# Patient Record
Sex: FEMALE | Race: WHITE | HISPANIC OR LATINO | URBAN - METROPOLITAN AREA
[De-identification: names, ages, dates, MRNs, and addresses within clinical notes are randomized per-mention and may not be internally consistent; named-entity substitution may affect disease eponyms.]

---

## 2017-07-08 ENCOUNTER — EMERGENCY (EMERGENCY)
Facility: HOSPITAL | Age: 47
LOS: 1 days | Discharge: HOME | End: 2017-07-08
Attending: EMERGENCY MEDICINE | Admitting: EMERGENCY MEDICINE
Payer: MEDICARE

## 2017-07-08 VITALS
WEIGHT: 198.42 LBS | DIASTOLIC BLOOD PRESSURE: 80 MMHG | HEART RATE: 80 BPM | RESPIRATION RATE: 18 BRPM | HEIGHT: 66 IN | TEMPERATURE: 99 F | OXYGEN SATURATION: 100 % | SYSTOLIC BLOOD PRESSURE: 145 MMHG

## 2017-07-08 VITALS
TEMPERATURE: 98 F | OXYGEN SATURATION: 97 % | DIASTOLIC BLOOD PRESSURE: 71 MMHG | SYSTOLIC BLOOD PRESSURE: 114 MMHG | RESPIRATION RATE: 18 BRPM | HEART RATE: 72 BPM

## 2017-07-08 DIAGNOSIS — R06.02 SHORTNESS OF BREATH: ICD-10-CM

## 2017-07-08 DIAGNOSIS — E87.6 HYPOKALEMIA: ICD-10-CM

## 2017-07-08 DIAGNOSIS — Z79.899 OTHER LONG TERM (CURRENT) DRUG THERAPY: ICD-10-CM

## 2017-07-08 DIAGNOSIS — I10 ESSENTIAL (PRIMARY) HYPERTENSION: ICD-10-CM

## 2017-07-08 DIAGNOSIS — R60.0 LOCALIZED EDEMA: ICD-10-CM

## 2017-07-08 LAB
ALBUMIN SERPL ELPH-MCNC: 4.6 G/DL — SIGNIFICANT CHANGE UP (ref 3.3–5)
ALP SERPL-CCNC: 85 U/L — SIGNIFICANT CHANGE UP (ref 40–120)
ALT FLD-CCNC: 18 U/L — SIGNIFICANT CHANGE UP (ref 10–45)
ANION GAP SERPL CALC-SCNC: 15 MMOL/L — SIGNIFICANT CHANGE UP (ref 5–17)
AST SERPL-CCNC: 18 U/L — SIGNIFICANT CHANGE UP (ref 10–40)
BASOPHILS NFR BLD AUTO: 0.5 % — SIGNIFICANT CHANGE UP (ref 0–2)
BILIRUB SERPL-MCNC: 0.2 MG/DL — SIGNIFICANT CHANGE UP (ref 0.2–1.2)
BUN SERPL-MCNC: 17 MG/DL — SIGNIFICANT CHANGE UP (ref 7–23)
CALCIUM SERPL-MCNC: 9.8 MG/DL — SIGNIFICANT CHANGE UP (ref 8.4–10.5)
CHLORIDE SERPL-SCNC: 95 MMOL/L — LOW (ref 96–108)
CK MB CFR SERPL CALC: 2.4 NG/ML — SIGNIFICANT CHANGE UP (ref 0–6.7)
CK SERPL-CCNC: 153 U/L — SIGNIFICANT CHANGE UP (ref 25–170)
CO2 SERPL-SCNC: 31 MMOL/L — SIGNIFICANT CHANGE UP (ref 22–31)
CREAT SERPL-MCNC: 1 MG/DL — SIGNIFICANT CHANGE UP (ref 0.5–1.3)
EOSINOPHIL NFR BLD AUTO: 2.8 % — SIGNIFICANT CHANGE UP (ref 0–6)
EXTRA BLUE TOP TUBE: SIGNIFICANT CHANGE UP
EXTRA SST TUBE: SIGNIFICANT CHANGE UP
GLUCOSE SERPL-MCNC: 102 MG/DL — HIGH (ref 70–99)
HCT VFR BLD CALC: 36.4 % — SIGNIFICANT CHANGE UP (ref 34.5–45)
HGB BLD-MCNC: 11.6 G/DL — SIGNIFICANT CHANGE UP (ref 11.5–15.5)
LYMPHOCYTES # BLD AUTO: 29.4 % — SIGNIFICANT CHANGE UP (ref 13–44)
MCHC RBC-ENTMCNC: 22.5 PG — LOW (ref 27–34)
MCHC RBC-ENTMCNC: 31.9 G/DL — LOW (ref 32–36)
MCV RBC AUTO: 70.7 FL — LOW (ref 80–100)
MONOCYTES NFR BLD AUTO: 7.3 % — SIGNIFICANT CHANGE UP (ref 2–14)
NEUTROPHILS NFR BLD AUTO: 60 % — SIGNIFICANT CHANGE UP (ref 43–77)
NT-PROBNP SERPL-SCNC: 61 PG/ML — SIGNIFICANT CHANGE UP (ref 0–300)
PLATELET # BLD AUTO: 252 K/UL — SIGNIFICANT CHANGE UP (ref 150–400)
POTASSIUM SERPL-MCNC: 2.9 MMOL/L — CRITICAL LOW (ref 3.5–5.3)
POTASSIUM SERPL-SCNC: 2.9 MMOL/L — CRITICAL LOW (ref 3.5–5.3)
PROT SERPL-MCNC: 7.8 G/DL — SIGNIFICANT CHANGE UP (ref 6–8.3)
RBC # BLD: 5.15 M/UL — SIGNIFICANT CHANGE UP (ref 3.8–5.2)
RBC # FLD: 14.1 % — SIGNIFICANT CHANGE UP (ref 10.3–16.9)
SODIUM SERPL-SCNC: 141 MMOL/L — SIGNIFICANT CHANGE UP (ref 135–145)
TROPONIN T SERPL-MCNC: <0.01 NG/ML — SIGNIFICANT CHANGE UP (ref 0–0.01)
WBC # BLD: 5.8 K/UL — SIGNIFICANT CHANGE UP (ref 3.8–10.5)
WBC # FLD AUTO: 5.8 K/UL — SIGNIFICANT CHANGE UP (ref 3.8–10.5)

## 2017-07-08 PROCEDURE — 99285 EMERGENCY DEPT VISIT HI MDM: CPT | Mod: 25

## 2017-07-08 PROCEDURE — 83880 ASSAY OF NATRIURETIC PEPTIDE: CPT

## 2017-07-08 PROCEDURE — 82550 ASSAY OF CK (CPK): CPT

## 2017-07-08 PROCEDURE — 71010: CPT | Mod: 26

## 2017-07-08 PROCEDURE — 93005 ELECTROCARDIOGRAM TRACING: CPT

## 2017-07-08 PROCEDURE — 84484 ASSAY OF TROPONIN QUANT: CPT

## 2017-07-08 PROCEDURE — 93010 ELECTROCARDIOGRAM REPORT: CPT

## 2017-07-08 PROCEDURE — 82553 CREATINE MB FRACTION: CPT

## 2017-07-08 PROCEDURE — 80053 COMPREHEN METABOLIC PANEL: CPT

## 2017-07-08 PROCEDURE — 85025 COMPLETE CBC W/AUTO DIFF WBC: CPT

## 2017-07-08 PROCEDURE — 71045 X-RAY EXAM CHEST 1 VIEW: CPT

## 2017-07-08 PROCEDURE — 99284 EMERGENCY DEPT VISIT MOD MDM: CPT | Mod: 25

## 2017-07-08 RX ORDER — POTASSIUM CHLORIDE 20 MEQ
40 PACKET (EA) ORAL ONCE
Qty: 0 | Refills: 0 | Status: COMPLETED | OUTPATIENT
Start: 2017-07-08 | End: 2017-07-08

## 2017-07-08 RX ORDER — POTASSIUM CHLORIDE 20 MEQ
10 PACKET (EA) ORAL ONCE
Qty: 0 | Refills: 0 | Status: COMPLETED | OUTPATIENT
Start: 2017-07-08 | End: 2017-07-08

## 2017-07-08 RX ADMIN — Medication 40 MILLIEQUIVALENT(S): at 23:47

## 2017-07-08 RX ADMIN — Medication 100 MILLIEQUIVALENT(S): at 23:47

## 2017-07-08 NOTE — ED PROVIDER NOTE - OBJECTIVE STATEMENT
48 y/o f with pmh of htn presents to ED with b/l lower ext swelling.  Pt states she has been on HCTZ for swelling in past and usually works but noticed it started increasing in last several days.  Denies chest pain, but complains of mild shortness of breath.  Hx of normal stress test in past.

## 2017-07-08 NOTE — ED ADULT NURSE NOTE - OBJECTIVE STATEMENT
Pt c/o BLE swelling and sob especially on exertion. Pt states onset today. BLE redness, tingling, and swelling pt states discomfort where swelling is as well. Pedal pulses bliat +2.

## 2017-07-08 NOTE — ED PROVIDER NOTE - MEDICAL DECISION MAKING DETAILS
pt with leg edema and dyspnea, pe - +1 edema b/l, labs show hypokalemia with normal cr, normal bnp and trop, cxr neg, hypokalemia most likely secondary to hctz - will replace in ED, recommend leg elevation, follow up with pmd for possible dose adjustment in hctz

## 2018-03-08 VITALS
SYSTOLIC BLOOD PRESSURE: 125 MMHG | HEART RATE: 97 BPM | RESPIRATION RATE: 20 BRPM | DIASTOLIC BLOOD PRESSURE: 84 MMHG | WEIGHT: 199.96 LBS | OXYGEN SATURATION: 97 % | TEMPERATURE: 99 F

## 2018-03-08 LAB
ALBUMIN SERPL ELPH-MCNC: 4.8 G/DL — SIGNIFICANT CHANGE UP (ref 3.3–5)
ALP SERPL-CCNC: 56 U/L — SIGNIFICANT CHANGE UP (ref 40–120)
ALT FLD-CCNC: 45 U/L — SIGNIFICANT CHANGE UP (ref 10–45)
ANION GAP SERPL CALC-SCNC: 15 MMOL/L — SIGNIFICANT CHANGE UP (ref 5–17)
AST SERPL-CCNC: 40 U/L — SIGNIFICANT CHANGE UP (ref 10–40)
BASOPHILS NFR BLD AUTO: 0.8 % — SIGNIFICANT CHANGE UP (ref 0–2)
BILIRUB SERPL-MCNC: 0.5 MG/DL — SIGNIFICANT CHANGE UP (ref 0.2–1.2)
BUN SERPL-MCNC: 12 MG/DL — SIGNIFICANT CHANGE UP (ref 7–23)
CALCIUM SERPL-MCNC: 10.1 MG/DL — SIGNIFICANT CHANGE UP (ref 8.4–10.5)
CHLORIDE SERPL-SCNC: 85 MMOL/L — LOW (ref 96–108)
CO2 SERPL-SCNC: 35 MMOL/L — HIGH (ref 22–31)
CREAT SERPL-MCNC: 0.95 MG/DL — SIGNIFICANT CHANGE UP (ref 0.5–1.3)
EOSINOPHIL NFR BLD AUTO: 2.4 % — SIGNIFICANT CHANGE UP (ref 0–6)
GLUCOSE SERPL-MCNC: 109 MG/DL — HIGH (ref 70–99)
HCT VFR BLD CALC: 38.7 % — SIGNIFICANT CHANGE UP (ref 34.5–45)
HGB BLD-MCNC: 12.6 G/DL — SIGNIFICANT CHANGE UP (ref 11.5–15.5)
LIDOCAIN IGE QN: 37 U/L — SIGNIFICANT CHANGE UP (ref 7–60)
LYMPHOCYTES # BLD AUTO: 30.2 % — SIGNIFICANT CHANGE UP (ref 13–44)
MCHC RBC-ENTMCNC: 23.1 PG — LOW (ref 27–34)
MCHC RBC-ENTMCNC: 32.6 G/DL — SIGNIFICANT CHANGE UP (ref 32–36)
MCV RBC AUTO: 71 FL — LOW (ref 80–100)
MONOCYTES NFR BLD AUTO: 8.1 % — SIGNIFICANT CHANGE UP (ref 2–14)
NEUTROPHILS NFR BLD AUTO: 58.5 % — SIGNIFICANT CHANGE UP (ref 43–77)
PLATELET # BLD AUTO: 285 K/UL — SIGNIFICANT CHANGE UP (ref 150–400)
POTASSIUM SERPL-MCNC: 2.3 MMOL/L — CRITICAL LOW (ref 3.5–5.3)
POTASSIUM SERPL-SCNC: 2.3 MMOL/L — CRITICAL LOW (ref 3.5–5.3)
PROT SERPL-MCNC: 8.5 G/DL — HIGH (ref 6–8.3)
RBC # BLD: 5.45 M/UL — HIGH (ref 3.8–5.2)
RBC # FLD: 13.8 % — SIGNIFICANT CHANGE UP (ref 10.3–16.9)
SODIUM SERPL-SCNC: 135 MMOL/L — SIGNIFICANT CHANGE UP (ref 135–145)
WBC # BLD: 6.6 K/UL — SIGNIFICANT CHANGE UP (ref 3.8–10.5)
WBC # FLD AUTO: 6.6 K/UL — SIGNIFICANT CHANGE UP (ref 3.8–10.5)

## 2018-03-08 PROCEDURE — 93010 ELECTROCARDIOGRAM REPORT: CPT | Mod: 77

## 2018-03-08 PROCEDURE — 99284 EMERGENCY DEPT VISIT MOD MDM: CPT | Mod: 25

## 2018-03-08 PROCEDURE — 93010 ELECTROCARDIOGRAM REPORT: CPT

## 2018-03-08 RX ORDER — POTASSIUM CHLORIDE 20 MEQ
10 PACKET (EA) ORAL ONCE
Qty: 0 | Refills: 0 | Status: COMPLETED | OUTPATIENT
Start: 2018-03-08 | End: 2018-03-08

## 2018-03-08 RX ORDER — POTASSIUM CHLORIDE 20 MEQ
40 PACKET (EA) ORAL ONCE
Qty: 0 | Refills: 0 | Status: COMPLETED | OUTPATIENT
Start: 2018-03-08 | End: 2018-03-08

## 2018-03-08 RX ORDER — MAGNESIUM SULFATE 500 MG/ML
1 VIAL (ML) INJECTION ONCE
Qty: 0 | Refills: 0 | Status: COMPLETED | OUTPATIENT
Start: 2018-03-08 | End: 2018-03-08

## 2018-03-08 RX ORDER — ONDANSETRON 8 MG/1
4 TABLET, FILM COATED ORAL ONCE
Qty: 0 | Refills: 0 | Status: COMPLETED | OUTPATIENT
Start: 2018-03-08 | End: 2018-03-08

## 2018-03-08 RX ORDER — POTASSIUM CHLORIDE 20 MEQ
10 PACKET (EA) ORAL ONCE
Qty: 0 | Refills: 0 | Status: DISCONTINUED | OUTPATIENT
Start: 2018-03-08 | End: 2018-03-08

## 2018-03-08 RX ORDER — POTASSIUM CHLORIDE 20 MEQ
20 PACKET (EA) ORAL ONCE
Qty: 0 | Refills: 0 | Status: COMPLETED | OUTPATIENT
Start: 2018-03-08 | End: 2018-03-08

## 2018-03-08 RX ORDER — LOPERAMIDE HCL 2 MG
4 TABLET ORAL ONCE
Qty: 0 | Refills: 0 | Status: COMPLETED | OUTPATIENT
Start: 2018-03-08 | End: 2018-03-08

## 2018-03-08 RX ORDER — POTASSIUM CHLORIDE 20 MEQ
60 PACKET (EA) ORAL ONCE
Qty: 0 | Refills: 0 | Status: DISCONTINUED | OUTPATIENT
Start: 2018-03-08 | End: 2018-03-08

## 2018-03-08 RX ORDER — SODIUM CHLORIDE 9 MG/ML
1000 INJECTION INTRAMUSCULAR; INTRAVENOUS; SUBCUTANEOUS ONCE
Qty: 0 | Refills: 0 | Status: COMPLETED | OUTPATIENT
Start: 2018-03-08 | End: 2018-03-08

## 2018-03-08 RX ADMIN — Medication 40 MILLIEQUIVALENT(S): at 21:51

## 2018-03-08 RX ADMIN — Medication 4 MILLIGRAM(S): at 23:36

## 2018-03-08 RX ADMIN — SODIUM CHLORIDE 1333.33 MILLILITER(S): 9 INJECTION INTRAMUSCULAR; INTRAVENOUS; SUBCUTANEOUS at 21:51

## 2018-03-08 RX ADMIN — Medication 20 MILLIEQUIVALENT(S): at 23:28

## 2018-03-08 RX ADMIN — ONDANSETRON 4 MILLIGRAM(S): 8 TABLET, FILM COATED ORAL at 22:13

## 2018-03-08 RX ADMIN — Medication 100 MILLIEQUIVALENT(S): at 22:13

## 2018-03-08 NOTE — ED ADULT NURSE NOTE - CHPI ED SYMPTOMS NEG
no hematuria/no burning urination/no abdominal distension/no dysuria/no blood in stool/no chills/no fever

## 2018-03-08 NOTE — ED ADULT TRIAGE NOTE - CHIEF COMPLAINT QUOTE
" having diarrhea for one week, several times a day, no bloody stool, nausea, drinking fluid but unable to eat due to nausea, feeling sick to stomach every time I have diarrhea" no fever, no chills

## 2018-03-09 ENCOUNTER — INPATIENT (INPATIENT)
Facility: HOSPITAL | Age: 48
LOS: 2 days | Discharge: HOME | DRG: 641 | End: 2018-03-12
Attending: STUDENT IN AN ORGANIZED HEALTH CARE EDUCATION/TRAINING PROGRAM | Admitting: STUDENT IN AN ORGANIZED HEALTH CARE EDUCATION/TRAINING PROGRAM
Payer: MEDICARE

## 2018-03-09 ENCOUNTER — TRANSCRIPTION ENCOUNTER (OUTPATIENT)
Age: 48
End: 2018-03-09

## 2018-03-09 DIAGNOSIS — E83.39 OTHER DISORDERS OF PHOSPHORUS METABOLISM: ICD-10-CM

## 2018-03-09 DIAGNOSIS — E87.6 HYPOKALEMIA: ICD-10-CM

## 2018-03-09 DIAGNOSIS — M54.9 DORSALGIA, UNSPECIFIED: ICD-10-CM

## 2018-03-09 DIAGNOSIS — R63.8 OTHER SYMPTOMS AND SIGNS CONCERNING FOOD AND FLUID INTAKE: ICD-10-CM

## 2018-03-09 DIAGNOSIS — E87.3 ALKALOSIS: ICD-10-CM

## 2018-03-09 DIAGNOSIS — I10 ESSENTIAL (PRIMARY) HYPERTENSION: ICD-10-CM

## 2018-03-09 DIAGNOSIS — R63.4 ABNORMAL WEIGHT LOSS: ICD-10-CM

## 2018-03-09 DIAGNOSIS — G43.909 MIGRAINE, UNSPECIFIED, NOT INTRACTABLE, WITHOUT STATUS MIGRAINOSUS: ICD-10-CM

## 2018-03-09 DIAGNOSIS — Z29.9 ENCOUNTER FOR PROPHYLACTIC MEASURES, UNSPECIFIED: ICD-10-CM

## 2018-03-09 DIAGNOSIS — Z98.890 OTHER SPECIFIED POSTPROCEDURAL STATES: Chronic | ICD-10-CM

## 2018-03-09 DIAGNOSIS — R20.2 PARESTHESIA OF SKIN: ICD-10-CM

## 2018-03-09 DIAGNOSIS — E66.09 OTHER OBESITY DUE TO EXCESS CALORIES: ICD-10-CM

## 2018-03-09 DIAGNOSIS — D64.9 ANEMIA, UNSPECIFIED: ICD-10-CM

## 2018-03-09 DIAGNOSIS — R19.7 DIARRHEA, UNSPECIFIED: ICD-10-CM

## 2018-03-09 DIAGNOSIS — J02.9 ACUTE PHARYNGITIS, UNSPECIFIED: ICD-10-CM

## 2018-03-09 LAB
ALBUMIN SERPL ELPH-MCNC: 3.8 G/DL — SIGNIFICANT CHANGE UP (ref 3.3–5)
ALP SERPL-CCNC: 42 U/L — SIGNIFICANT CHANGE UP (ref 40–120)
ALT FLD-CCNC: 32 U/L — SIGNIFICANT CHANGE UP (ref 10–45)
ANION GAP SERPL CALC-SCNC: 11 MMOL/L — SIGNIFICANT CHANGE UP (ref 5–17)
ANION GAP SERPL CALC-SCNC: 13 MMOL/L — SIGNIFICANT CHANGE UP (ref 5–17)
ANION GAP SERPL CALC-SCNC: 8 MMOL/L — SIGNIFICANT CHANGE UP (ref 5–17)
AST SERPL-CCNC: 28 U/L — SIGNIFICANT CHANGE UP (ref 10–40)
BILIRUB SERPL-MCNC: 0.3 MG/DL — SIGNIFICANT CHANGE UP (ref 0.2–1.2)
BLD GP AB SCN SERPL QL: NEGATIVE — SIGNIFICANT CHANGE UP
BUN SERPL-MCNC: 7 MG/DL — SIGNIFICANT CHANGE UP (ref 7–23)
BUN SERPL-MCNC: 7 MG/DL — SIGNIFICANT CHANGE UP (ref 7–23)
BUN SERPL-MCNC: 9 MG/DL — SIGNIFICANT CHANGE UP (ref 7–23)
CALCIUM SERPL-MCNC: 8 MG/DL — LOW (ref 8.4–10.5)
CALCIUM SERPL-MCNC: 8.2 MG/DL — LOW (ref 8.4–10.5)
CALCIUM SERPL-MCNC: 8.5 MG/DL — SIGNIFICANT CHANGE UP (ref 8.4–10.5)
CALCIUM SERPL-MCNC: 8.6 MG/DL — SIGNIFICANT CHANGE UP (ref 8.4–10.5)
CALCIUM SERPL-MCNC: 8.6 MG/DL — SIGNIFICANT CHANGE UP (ref 8.4–10.5)
CHLORIDE SERPL-SCNC: 104 MMOL/L — SIGNIFICANT CHANGE UP (ref 96–108)
CHLORIDE SERPL-SCNC: 90 MMOL/L — LOW (ref 96–108)
CHLORIDE SERPL-SCNC: 93 MMOL/L — LOW (ref 96–108)
CHLORIDE SERPL-SCNC: 96 MMOL/L — SIGNIFICANT CHANGE UP (ref 96–108)
CHLORIDE SERPL-SCNC: 97 MMOL/L — SIGNIFICANT CHANGE UP (ref 96–108)
CHOLEST SERPL-MCNC: 224 MG/DL — HIGH (ref 10–199)
CO2 SERPL-SCNC: 27 MMOL/L — SIGNIFICANT CHANGE UP (ref 22–31)
CO2 SERPL-SCNC: 29 MMOL/L — SIGNIFICANT CHANGE UP (ref 22–31)
CO2 SERPL-SCNC: 31 MMOL/L — SIGNIFICANT CHANGE UP (ref 22–31)
CO2 SERPL-SCNC: 32 MMOL/L — HIGH (ref 22–31)
CO2 SERPL-SCNC: 33 MMOL/L — HIGH (ref 22–31)
CREAT SERPL-MCNC: 0.77 MG/DL — SIGNIFICANT CHANGE UP (ref 0.5–1.3)
CREAT SERPL-MCNC: 0.8 MG/DL — SIGNIFICANT CHANGE UP (ref 0.5–1.3)
CREAT SERPL-MCNC: 0.86 MG/DL — SIGNIFICANT CHANGE UP (ref 0.5–1.3)
CREAT SERPL-MCNC: 0.87 MG/DL — SIGNIFICANT CHANGE UP (ref 0.5–1.3)
CREAT SERPL-MCNC: 0.89 MG/DL — SIGNIFICANT CHANGE UP (ref 0.5–1.3)
CRP SERPL-MCNC: 0.36 MG/DL — SIGNIFICANT CHANGE UP (ref 0–0.4)
ERYTHROCYTE [SEDIMENTATION RATE] IN BLOOD: 15 MM/HR — SIGNIFICANT CHANGE UP
GLUCOSE BLDC GLUCOMTR-MCNC: 104 MG/DL — HIGH (ref 70–99)
GLUCOSE SERPL-MCNC: 106 MG/DL — HIGH (ref 70–99)
GLUCOSE SERPL-MCNC: 106 MG/DL — HIGH (ref 70–99)
GLUCOSE SERPL-MCNC: 107 MG/DL — HIGH (ref 70–99)
GLUCOSE SERPL-MCNC: 111 MG/DL — HIGH (ref 70–99)
GLUCOSE SERPL-MCNC: 138 MG/DL — HIGH (ref 70–99)
HBA1C BLD-MCNC: 5.5 % — SIGNIFICANT CHANGE UP (ref 4–5.6)
HCG SERPL-ACNC: 0.9 MIU/ML — SIGNIFICANT CHANGE UP
HCG SERPL-ACNC: 0.9 MIU/ML — SIGNIFICANT CHANGE UP
HCT VFR BLD CALC: 34.4 % — LOW (ref 34.5–45)
HDLC SERPL-MCNC: 33 MG/DL — LOW (ref 40–125)
HGB BLD-MCNC: 10.8 G/DL — LOW (ref 11.5–15.5)
INR BLD: 1.11 — SIGNIFICANT CHANGE UP (ref 0.88–1.16)
LIPID PNL WITH DIRECT LDL SERPL: 145 MG/DL — HIGH
MAGNESIUM SERPL-MCNC: 2.4 MG/DL — SIGNIFICANT CHANGE UP (ref 1.6–2.6)
MAGNESIUM SERPL-MCNC: 2.6 MG/DL — SIGNIFICANT CHANGE UP (ref 1.6–2.6)
MCHC RBC-ENTMCNC: 23.4 PG — LOW (ref 27–34)
MCHC RBC-ENTMCNC: 31.4 G/DL — LOW (ref 32–36)
MCV RBC AUTO: 74.6 FL — LOW (ref 80–100)
PHOSPHATE SERPL-MCNC: 0.9 MG/DL — CRITICAL LOW (ref 2.5–4.5)
PHOSPHATE SERPL-MCNC: 2.2 MG/DL — LOW (ref 2.5–4.5)
PLATELET # BLD AUTO: 246 K/UL — SIGNIFICANT CHANGE UP (ref 150–400)
POTASSIUM SERPL-MCNC: 2 MMOL/L — CRITICAL LOW (ref 3.5–5.3)
POTASSIUM SERPL-MCNC: 2.7 MMOL/L — CRITICAL LOW (ref 3.5–5.3)
POTASSIUM SERPL-MCNC: 2.9 MMOL/L — CRITICAL LOW (ref 3.5–5.3)
POTASSIUM SERPL-MCNC: 3.1 MMOL/L — LOW (ref 3.5–5.3)
POTASSIUM SERPL-SCNC: 2 MMOL/L — CRITICAL LOW (ref 3.5–5.3)
POTASSIUM SERPL-SCNC: 2.7 MMOL/L — CRITICAL LOW (ref 3.5–5.3)
POTASSIUM SERPL-SCNC: 2.9 MMOL/L — CRITICAL LOW (ref 3.5–5.3)
POTASSIUM SERPL-SCNC: 3.1 MMOL/L — LOW (ref 3.5–5.3)
PROT SERPL-MCNC: 6.4 G/DL — SIGNIFICANT CHANGE UP (ref 6–8.3)
PROTHROM AB SERPL-ACNC: 12.4 SEC — SIGNIFICANT CHANGE UP (ref 9.8–12.7)
RBC # BLD: 4.61 M/UL — SIGNIFICANT CHANGE UP (ref 3.8–5.2)
RBC # FLD: 14 % — SIGNIFICANT CHANGE UP (ref 10.3–16.9)
RH IG SCN BLD-IMP: POSITIVE — SIGNIFICANT CHANGE UP
SODIUM SERPL-SCNC: 135 MMOL/L — SIGNIFICANT CHANGE UP (ref 135–145)
SODIUM SERPL-SCNC: 136 MMOL/L — SIGNIFICANT CHANGE UP (ref 135–145)
SODIUM SERPL-SCNC: 137 MMOL/L — SIGNIFICANT CHANGE UP (ref 135–145)
SODIUM SERPL-SCNC: 139 MMOL/L — SIGNIFICANT CHANGE UP (ref 135–145)
SODIUM SERPL-SCNC: 143 MMOL/L — SIGNIFICANT CHANGE UP (ref 135–145)
TOTAL CHOLESTEROL/HDL RATIO MEASUREMENT: 6.8 RATIO — SIGNIFICANT CHANGE UP (ref 3.3–7.1)
TRIGL SERPL-MCNC: 230 MG/DL — HIGH (ref 10–149)
TSH SERPL-MCNC: 0.54 UIU/ML — SIGNIFICANT CHANGE UP (ref 0.35–4.94)
WBC # BLD: 5.4 K/UL — SIGNIFICANT CHANGE UP (ref 3.8–10.5)
WBC # FLD AUTO: 5.4 K/UL — SIGNIFICANT CHANGE UP (ref 3.8–10.5)

## 2018-03-09 PROCEDURE — 99233 SBSQ HOSP IP/OBS HIGH 50: CPT | Mod: GC

## 2018-03-09 PROCEDURE — 99223 1ST HOSP IP/OBS HIGH 75: CPT | Mod: GC

## 2018-03-09 RX ORDER — POTASSIUM PHOSPHATE, MONOBASIC POTASSIUM PHOSPHATE, DIBASIC 236; 224 MG/ML; MG/ML
30 INJECTION, SOLUTION INTRAVENOUS ONCE
Qty: 0 | Refills: 0 | Status: COMPLETED | OUTPATIENT
Start: 2018-03-09 | End: 2018-03-09

## 2018-03-09 RX ORDER — SODIUM CHLORIDE 9 MG/ML
1000 INJECTION INTRAMUSCULAR; INTRAVENOUS; SUBCUTANEOUS ONCE
Qty: 0 | Refills: 0 | Status: COMPLETED | OUTPATIENT
Start: 2018-03-09 | End: 2018-03-09

## 2018-03-09 RX ORDER — ACETAMINOPHEN 500 MG
325 TABLET ORAL EVERY 4 HOURS
Qty: 0 | Refills: 0 | Status: DISCONTINUED | OUTPATIENT
Start: 2018-03-09 | End: 2018-03-11

## 2018-03-09 RX ORDER — DEXTROSE 50 % IN WATER 50 %
1 SYRINGE (ML) INTRAVENOUS ONCE
Qty: 0 | Refills: 0 | Status: DISCONTINUED | OUTPATIENT
Start: 2018-03-09 | End: 2018-03-09

## 2018-03-09 RX ORDER — POTASSIUM CHLORIDE 20 MEQ
40 PACKET (EA) ORAL ONCE
Qty: 0 | Refills: 0 | Status: COMPLETED | OUTPATIENT
Start: 2018-03-09 | End: 2018-03-09

## 2018-03-09 RX ORDER — POTASSIUM CHLORIDE 20 MEQ
10 PACKET (EA) ORAL
Qty: 0 | Refills: 0 | Status: COMPLETED | OUTPATIENT
Start: 2018-03-09 | End: 2018-03-09

## 2018-03-09 RX ORDER — POTASSIUM CHLORIDE 20 MEQ
60 PACKET (EA) ORAL ONCE
Qty: 0 | Refills: 0 | Status: DISCONTINUED | OUTPATIENT
Start: 2018-03-09 | End: 2018-03-09

## 2018-03-09 RX ORDER — SODIUM CHLORIDE 9 MG/ML
1000 INJECTION, SOLUTION INTRAVENOUS
Qty: 0 | Refills: 0 | Status: DISCONTINUED | OUTPATIENT
Start: 2018-03-09 | End: 2018-03-09

## 2018-03-09 RX ORDER — DEXTROSE 50 % IN WATER 50 %
25 SYRINGE (ML) INTRAVENOUS ONCE
Qty: 0 | Refills: 0 | Status: DISCONTINUED | OUTPATIENT
Start: 2018-03-09 | End: 2018-03-09

## 2018-03-09 RX ORDER — HYDROCHLOROTHIAZIDE 25 MG
25 TABLET ORAL DAILY
Qty: 0 | Refills: 0 | Status: DISCONTINUED | OUTPATIENT
Start: 2018-03-09 | End: 2018-03-09

## 2018-03-09 RX ORDER — SODIUM CHLORIDE 9 MG/ML
1000 INJECTION INTRAMUSCULAR; INTRAVENOUS; SUBCUTANEOUS
Qty: 0 | Refills: 0 | Status: DISCONTINUED | OUTPATIENT
Start: 2018-03-09 | End: 2018-03-09

## 2018-03-09 RX ORDER — POTASSIUM CHLORIDE 20 MEQ
40 PACKET (EA) ORAL EVERY 4 HOURS
Qty: 0 | Refills: 0 | Status: COMPLETED | OUTPATIENT
Start: 2018-03-09 | End: 2018-03-09

## 2018-03-09 RX ORDER — HEPARIN SODIUM 5000 [USP'U]/ML
5000 INJECTION INTRAVENOUS; SUBCUTANEOUS EVERY 8 HOURS
Qty: 0 | Refills: 0 | Status: DISCONTINUED | OUTPATIENT
Start: 2018-03-09 | End: 2018-03-12

## 2018-03-09 RX ORDER — LOPERAMIDE HCL 2 MG
2 TABLET ORAL EVERY 4 HOURS
Qty: 0 | Refills: 0 | Status: DISCONTINUED | OUTPATIENT
Start: 2018-03-09 | End: 2018-03-12

## 2018-03-09 RX ORDER — GLUCAGON INJECTION, SOLUTION 0.5 MG/.1ML
1 INJECTION, SOLUTION SUBCUTANEOUS ONCE
Qty: 0 | Refills: 0 | Status: DISCONTINUED | OUTPATIENT
Start: 2018-03-09 | End: 2018-03-09

## 2018-03-09 RX ORDER — POTASSIUM CHLORIDE 20 MEQ
40 PACKET (EA) ORAL EVERY 4 HOURS
Qty: 0 | Refills: 0 | Status: DISCONTINUED | OUTPATIENT
Start: 2018-03-09 | End: 2018-03-09

## 2018-03-09 RX ORDER — DEXTROSE 50 % IN WATER 50 %
12.5 SYRINGE (ML) INTRAVENOUS ONCE
Qty: 0 | Refills: 0 | Status: DISCONTINUED | OUTPATIENT
Start: 2018-03-09 | End: 2018-03-09

## 2018-03-09 RX ORDER — SODIUM CHLORIDE 9 MG/ML
1000 INJECTION, SOLUTION INTRAVENOUS
Qty: 0 | Refills: 0 | Status: DISCONTINUED | OUTPATIENT
Start: 2018-03-09 | End: 2018-03-12

## 2018-03-09 RX ORDER — INSULIN LISPRO 100/ML
VIAL (ML) SUBCUTANEOUS
Qty: 0 | Refills: 0 | Status: DISCONTINUED | OUTPATIENT
Start: 2018-03-09 | End: 2018-03-09

## 2018-03-09 RX ADMIN — SODIUM CHLORIDE 100 MILLILITER(S): 9 INJECTION, SOLUTION INTRAVENOUS at 09:33

## 2018-03-09 RX ADMIN — SODIUM CHLORIDE 100 MILLILITER(S): 9 INJECTION INTRAMUSCULAR; INTRAVENOUS; SUBCUTANEOUS at 01:57

## 2018-03-09 RX ADMIN — HEPARIN SODIUM 5000 UNIT(S): 5000 INJECTION INTRAVENOUS; SUBCUTANEOUS at 05:37

## 2018-03-09 RX ADMIN — Medication 100 MILLIEQUIVALENT(S): at 03:59

## 2018-03-09 RX ADMIN — Medication 325 MILLIGRAM(S): at 23:45

## 2018-03-09 RX ADMIN — Medication 325 MILLIGRAM(S): at 17:11

## 2018-03-09 RX ADMIN — Medication 40 MILLIEQUIVALENT(S): at 11:48

## 2018-03-09 RX ADMIN — Medication 100 MILLIEQUIVALENT(S): at 06:46

## 2018-03-09 RX ADMIN — Medication 325 MILLIGRAM(S): at 10:42

## 2018-03-09 RX ADMIN — Medication 40 MILLIEQUIVALENT(S): at 21:50

## 2018-03-09 RX ADMIN — Medication 40 MILLIEQUIVALENT(S): at 03:59

## 2018-03-09 RX ADMIN — POTASSIUM PHOSPHATE, MONOBASIC POTASSIUM PHOSPHATE, DIBASIC 85 MILLIMOLE(S): 236; 224 INJECTION, SOLUTION INTRAVENOUS at 08:35

## 2018-03-09 RX ADMIN — Medication 325 MILLIGRAM(S): at 21:49

## 2018-03-09 RX ADMIN — Medication 40 MILLIEQUIVALENT(S): at 05:37

## 2018-03-09 RX ADMIN — Medication 325 MILLIGRAM(S): at 16:11

## 2018-03-09 RX ADMIN — Medication 40 MILLIEQUIVALENT(S): at 02:48

## 2018-03-09 RX ADMIN — SODIUM CHLORIDE 100 MILLILITER(S): 9 INJECTION, SOLUTION INTRAVENOUS at 04:54

## 2018-03-09 RX ADMIN — Medication 40 MILLIEQUIVALENT(S): at 18:20

## 2018-03-09 RX ADMIN — POTASSIUM PHOSPHATE, MONOBASIC POTASSIUM PHOSPHATE, DIBASIC 85 MILLIMOLE(S): 236; 224 INJECTION, SOLUTION INTRAVENOUS at 18:03

## 2018-03-09 RX ADMIN — SODIUM CHLORIDE 100 MILLILITER(S): 9 INJECTION, SOLUTION INTRAVENOUS at 15:14

## 2018-03-09 RX ADMIN — Medication 100 MILLIEQUIVALENT(S): at 04:55

## 2018-03-09 RX ADMIN — Medication 325 MILLIGRAM(S): at 01:57

## 2018-03-09 RX ADMIN — Medication 100 GRAM(S): at 00:05

## 2018-03-09 RX ADMIN — SODIUM CHLORIDE 1000 MILLILITER(S): 9 INJECTION INTRAMUSCULAR; INTRAVENOUS; SUBCUTANEOUS at 01:15

## 2018-03-09 RX ADMIN — Medication 325 MILLIGRAM(S): at 09:42

## 2018-03-09 RX ADMIN — HEPARIN SODIUM 5000 UNIT(S): 5000 INJECTION INTRAVENOUS; SUBCUTANEOUS at 21:50

## 2018-03-09 NOTE — DISCHARGE NOTE ADULT - HOSPITAL COURSE
47 year old female w/ pmhx of childhood asthma, herniated disk, HTN, migraine (on topiramate), benign vocal chord nodules removed 2003, cholecystectomy 1995 p/w multiple, non-specific symptoms, which include abdominal pain and diarrhea x 1 week. Patient states non-specific symptoms started in October, when she starting taking Topiramate. Since then, she's been experiencing these symptoms as well as a decreased appetite w/ decreased PO intake and > 60 lb weight loss. Since last week, patient has been having 5 watery, non-bloody, stools daily. In ED, temp: 98.8, HR: 97, /84, RR: 20, O2: 97% room air. Labs notable for K 2.3. EKG: NSR w/ prolonged QTc. s/p total of 70mEq of K along w/ 2L NS bolus, Imodium, Zofran with symptomatic relief. ICU consulted for hypokalemia and EKG changes, however, deemed stable for RMF. Potassium adair to the 2.7-2.9 after continued repletion. 47 year old female w/ pmhx of childhood asthma, herniated disk, HTN, migraine (on topiramate), benign vocal chord nodules removed 2003, cholecystectomy 1995 p/w multiple, non-specific symptoms, which include abdominal pain and diarrhea x 1 week. Patient states non-specific symptoms started in October, when she starting taking Topiramate. Since then, she's been experiencing these symptoms as well as a decreased appetite w/ decreased PO intake and > 60 lb weight loss. Since last week, patient has been having 5 watery, non-bloody, stools daily. In ED, temp: 98.8, HR: 97, /84, RR: 20, O2: 97% room air. Labs notable for K 2.3. EKG: NSR w/ prolonged QTc. s/p total of 70mEq of K along w/ 2L NS bolus, Imodium, Zofran with symptomatic relief. ICU consulted for hypokalemia and EKG changes, however, deemed stable for RMF. Electrolyte levels gradually improved and stabilized with repletion, and diarrhea subsided. She was tolerating PO intake well and was stable for discharge with Nephrology follow up and discontinuation of home HCTZ. 47 year old female w/ pmhx of childhood asthma, herniated disk, HTN, migraine (on topiramate), benign vocal chord nodules removed 2003, cholecystectomy 1995 p/w multiple, non-specific symptoms, which include abdominal pain and diarrhea x 1 week. Patient states non-specific symptoms started in October, when she starting taking Topiramate. Since then, she's been experiencing these symptoms as well as a decreased appetite w/ decreased PO intake and > 60 lb weight loss. Since last week, patient has been having 5 watery, non-bloody, stools daily. In ED, temp: 98.8, HR: 97, /84, RR: 20, O2: 97% room air. Labs notable for K 2.3. EKG: NSR w/ prolonged QTc. s/p total of 70mEq of K along w/ 2L NS bolus, Imodium, Zofran with symptomatic relief. ICU consulted for hypokalemia and EKG changes, however, deemed stable for RMF. Electrolyte levels gradually improved and stabilized with repletion, and diarrhea subsided. She was tolerating PO intake well and was stable for discharge with Nephrology follow up and discontinuation of home HCTZ.  Discharge counseling was provided using the COMPLETES-NOW protocol. Teach back was demonstrated by the patient.

## 2018-03-09 NOTE — CONSULT NOTE ADULT - PROBLEM SELECTOR RECOMMENDATION 5
Anemia with hemoglobin 10.8 at present.   Obtain iron studies  No indication for EPO or transfusion at present.  Transfuse PRBC per primary team if indicated.

## 2018-03-09 NOTE — DISCHARGE NOTE ADULT - CARE PROVIDER_API CALL
Bharat Booth  [address] [place] [address]  Phone: [phone]  Fax: (   )    -    Chata Salmeron  [address] [address]  Phone: [phone]  Fax: (   )    - Bharat Booth  [address] [place] [address]  Phone: [phone]  Fax: (   )    -    Chata Salmeron  [address] [address]  Phone: [phone]  Fax: (   )    -

## 2018-03-09 NOTE — H&P ADULT - PROBLEM SELECTOR PROBLEM 8
Nutrition, metabolism, and development symptoms Need for prophylactic measure Obesity due to excess calories, unspecified classification, unspecified whether serious comorbidity present

## 2018-03-09 NOTE — H&P ADULT - PROBLEM SELECTOR PLAN 1
K 2.3 w/ prolonged Qtc 484, corrected after repletion. s/p 70mEq in ED. Gave another 40mEq. Likely related to persistent diarrhea and HCTZ use.   - Aggressive Electrolyte repletion w/ PO and IV K  - Hold HCTZ; currently normo-tensive   - trend BMP q6h  - repeat EKG in AM K 2.3 w/ prolonged Qtc 484, corrected after repletion. s/p 70mEq in ED. Gave another 40mEq. Likely related to persistent diarrhea and thaizide use.   - Aggressive Electrolyte repletion w/ PO and IV K  - Hold HCTZ; currently normo-tensive   - trend BMP q6h  - repeat EKG in AM

## 2018-03-09 NOTE — H&P ADULT - PROBLEM SELECTOR PLAN 4
Holding Topirimate  Tylenol PRN  Follow up with neurologist as outpatient > 60 lbs since October when started Topiramate. Known side effect of medication. However, can not rule out malignancy. Patient UTD on recommended screening. Last Mammo 3 years ago (normal). Last PAP 2 years ago (normal). No colonoscopies. Family hx of Lung Ca in father (dx @ 82 y/o)   - CT Abdo/Pelvis w/ PO and IV contrast  - hold Topiramate > 60 lbs since October when started Topiramate. Known side effect of medication. However, can not rule out malignancy. Patient UTD on recommended screening. Last Mammo 3 years ago (normal). Last PAP 2 years ago (normal). No colonoscopies. Family hx of Lung Ca in father (dx @ 82 y/o)   - hold Topiramate; follow up w/ PCP and neurology for further evaluation

## 2018-03-09 NOTE — H&P ADULT - PROBLEM SELECTOR PLAN 6
Tylenol PRN HCTZ 25mg daily  Holding anti-hypertensives in setting of electrolyte abnormalities  Currently normo-tensive on  chlorthalidone daily  Holding anti-hypertensives in setting of electrolyte abnormalities; may need another agent in place of thiazide   Currently normo-tensive

## 2018-03-09 NOTE — DISCHARGE NOTE ADULT - ADDITIONAL INSTRUCTIONS
Please make an appointment with your PCP and Neurologist to be seen as soon as possible for follow up. Please make an appointment with your PCP Dr. Bharat Booth on Monday March 19 at 10:20 AM, and your Neurologist to be seen as soon as possible for follow up. Please make an appointment with your PCP Dr. Bharat Booth on Monday March 19 at 10:20 AM, and your Neurologist to be seen as soon as possible for follow up. Please have Dr. Booth refer you to a kidney doctor. Please make an appointment with your PCP Dr. Bharat Booth on Monday March 19 at 10:20 AM, and your Neurologist to be seen as soon as possible for follow up. Please have Dr. Booth refer you to a kidney doctor.  If the scheduled appointment does not work for your schedule, please call to reschedule for a time that works to see the doctor within 7 days of leaving the hospital.

## 2018-03-09 NOTE — CONSULT NOTE ADULT - ATTENDING COMMENTS
pharyngitis, gastroenteritis, dehydration, contraction alkalosis, hypokalemia  agree with potassium supplementation po and iv, IVF hydration, would akk KCl to IVF  evaluate for strep, RVP  QTc normalized,   no indication for tele/icu at this time

## 2018-03-09 NOTE — ED PROVIDER NOTE - MEDICAL DECISION MAKING DETAILS
hypokalemia with prolonged QTc secondary to diarrhea on HCTZ. WIll give fluids, replete potassium and magnesium. ICU consult for telemetry admission.

## 2018-03-09 NOTE — H&P ADULT - PROBLEM SELECTOR PLAN 8
IVF @ 100cc/hr   aggressive electrolyte repletion   NPO until CT scan gets done     FULL CODE    Dispo: RMDEON HSQ  Ambulate as tolerated reports weight loss via topamax use; encourage diet and lifestyle modifications

## 2018-03-09 NOTE — CONSULT NOTE ADULT - ASSESSMENT
7 year old female w/ pmhx of childhood asthma, herniate disk, HTN, migraine (on topiramate), benign vocal chord nodules removed 2003, cholecystectomy 1995 p/w multiple, non-specific symptoms, which include abdominal pain and diarrhea x 1 week. Nephrology consult called for hypokalemia.
47 F PMH childhood asthma, herniate disk (takes tylenol almost daily and intermittant NSAID), HTN, migraine (on topiramate), benign vocal chord nodules removed 2003, prior strep throat, prior sinus infections, cholecystectomy 1995 p/w throat pain/L ear and neck pain/abd pain/nausea/diarrhea x 1 week. Admit RMF.

## 2018-03-09 NOTE — H&P ADULT - PROBLEM SELECTOR PLAN 9
IVF @ 100cc/hr   aggressive electrolyte repletion   NPO until CT scan gets done     FULL CODE    Dispo: RMDEON HSQ  Ambulate as tolerated

## 2018-03-09 NOTE — DISCHARGE NOTE ADULT - PLAN OF CARE
Discharge home You were admitted to the hospital with diarrhea, but noted to have very low potassium levels, which frequently occurs in the setting of diarrhea. We gave you potassium through an IV and orally, and eventually your potassium levels did return to the normal range. Please follow up as an outpatient with your primary care doctor Bharat Booth within one week of leaving the hospital to have your levels checked again, as potassium normals outside of the normal range can cause heart problems. Your diarrhea improved with Imodium. The cause at this time is unclear, so it is important to see your primary care doctor Bharat Booth within the next 7 days to follow up and determine whether you would need to see a specialist. If you develop fever and/or worsening diarrhea, please return to the hospital to be re-evaluated. Please see your neurologist Chata Salmeron as soon as possible to discuss treatment for your migraines. Please continue your home medications. You were admitted to the hospital with diarrhea, but noted to have very low potassium levels, which frequently occurs in the setting of diarrhea. We gave you potassium through an IV and orally, and eventually your potassium levels did return to the normal range. In addition, Hydrochlorothiazide can cause abnormal electrolyte levels, so we would like you to stop taking this medication at this time. Please follow up as an outpatient with your primary care doctor Bharat Booth within one week of leaving the hospital to have your levels checked again, as potassium normals outside of the normal range can cause heart problems. You were admitted to the hospital with diarrhea, but noted to have very low potassium levels, which frequently occurs in the setting of diarrhea. We gave you potassium through an IV and orally, and eventually your potassium levels did return to the normal range. In addition, Hydrochlorothiazide can cause abnormal electrolyte levels, so we would like you to stop taking this medication at this time. Please follow up as an outpatient with your primary care doctor Bharat Booth on Monday March 19 at 10:20 AM, and BE SURE TO ASK YOUR DOCTOR TO CHECK YOUR ELECTROLYTE LEVELS, as potassium levels outside of the normal range can cause heart problems. Your diarrhea improved with Imodium. The cause at this time is unclear, so it is important to see your primary care doctor Bharat Booth on Monday March 19 at 10:20 AM and determine whether you would need to see a specialist. If you develop fever and/or worsening diarrhea, please return to the hospital to be re-evaluated. You were admitted to the hospital with diarrhea, but noted to have very low potassium levels, which frequently occurs in the setting of diarrhea. We gave you potassium through an IV and orally, and eventually your potassium levels did return to the normal range. In addition, Hydrochlorothiazide can cause abnormal electrolyte levels, so we would like you to stop taking this medication at this time. Please follow up as an outpatient with your primary care doctor Bharat Booth on Monday March 19 at 10:20 AM, and BE SURE TO ASK YOUR DOCTOR TO CHECK YOUR ELECTROLYTE LEVELS, as potassium levels outside of the normal range can cause heart problems. Please have Dr. Booth refer you to a kidney doctor. You were admitted to the hospital with diarrhea, but noted to have very low potassium levels, which frequently occurs in the setting of diarrhea. We gave you potassium through an IV and orally, and eventually your potassium levels did return to the normal range. In addition, Hydrochlorothiazide can cause abnormal electrolyte levels, so we would like you to stop taking this medication at this time. Please follow up as an outpatient with your primary care doctor Bharat Booth on Monday March 19 at 10:20 AM, and BE SURE TO ASK YOUR DOCTOR TO CHECK YOUR ELECTROLYTE LEVELS, as potassium levels outside of the normal range can cause heart problems. Please have Dr. Booth refer you to a kidney doctor. If the scheduled appointment does not work for your schedule, please call to reschedule for a time that works to see the doctor within 7 days of leaving the hospital. Your diarrhea improved with Imodium. The cause at this time is unclear, so it is important to see your primary care doctor Bharat Booht on Monday March 19 at 10:20 AM and determine whether you would need to see a specialist. If you develop fever and/or worsening diarrhea, please return to the hospital to be re-evaluated. If the scheduled appointment does not work for your schedule, please call to reschedule for a time that works to see the doctor within 7 days of leaving the hospital.

## 2018-03-09 NOTE — PROGRESS NOTE ADULT - SUBJECTIVE AND OBJECTIVE BOX
Patient is a 47y old  Female who presents with a chief complaint of Diarrhea (09 Mar 2018 15:04)      INTERVAL HPI/OVERNIGHT EVENTS:    Pt. seen and examined at 12:30PM  Pt. c/o diarrhea x several days prior to admission, now improved s/p Imodium  also c/o numbness/tingling in her fingers, on and off x several months  h/o hypokalemia; seen in [facility] last July  per Pt., she saw a neurologist in December for HA mgmt, and was told her DOTTY and ESR were elevated, and was referred to a rheumatologist (but never went)  No F/C, abdominal pain, CP, SOB, palpitations      Review of Systems: 12 point review of systems otherwise negative    MEDICATIONS  (STANDING):  heparin  Injectable 5000 Unit(s) SubCutaneous every 8 hours  potassium chloride    Tablet ER 40 milliEquivalent(s) Oral every 4 hours  potassium phosphate IVPB 30 milliMole(s) IV Intermittent once  sodium chloride 0.9% 1000 milliLiter(s) (100 mL/Hr) IV Continuous <Continuous>    MEDICATIONS  (PRN):  acetaminophen   Tablet. 325 milliGRAM(s) Oral every 4 hours PRN Moderate Pain (4 - 6)  loperamide 2 milliGRAM(s) Oral every 4 hours PRN Diarrhea      Allergies    No Known Allergies    Intolerances          Vital Signs Last 24 Hrs  T(C): 36.3 (09 Mar 2018 16:17), Max: 37.1 (08 Mar 2018 20:25)  T(F): 97.4 (09 Mar 2018 16:17), Max: 98.8 (08 Mar 2018 20:25)  HR: 79 (09 Mar 2018 16:17) (79 - 106)  BP: 104/69 (09 Mar 2018 16:17) (97/62 - 125/84)  BP(mean): --  RR: 18 (09 Mar 2018 16:17) (18 - 20)  SpO2: 100% (09 Mar 2018 16:17) (95% - 100%)  CAPILLARY BLOOD GLUCOSE      POCT Blood Glucose.: 104 mg/dL (09 Mar 2018 06:26)  POCT Blood Glucose.: 110 mg/dL (08 Mar 2018 20:24)        Physical Exam:  (at 12:30PM)  Daily     Daily   General:  well-appearing in NAD  HEENT:  MMM  CV:  no JVD  Abdomen:  soft NT ND obese  Extremities:  no edema B/L LE  Skin:  WWP  Neuro:  AAOx3    LABS:                        10.8   5.4   )-----------( 246      ( 09 Mar 2018 05:47 )             34.4     03-09    143  |  104  |  7   ----------------------------<  107<H>  3.1<L>   |  31  |  0.77    Ca    8.2<L>      09 Mar 2018 17:03  Phos  2.2     03-09  Mg     2.4     03-09    TPro  6.4  /  Alb  3.8  /  TBili  0.3  /  DBili  x   /  AST  28  /  ALT  32  /  AlkPhos  42  03-09    PT/INR - ( 09 Mar 2018 05:47 )   PT: 12.4 sec;   INR: 1.11                  RADIOLOGY & ADDITIONAL TESTS:    ---------------------------------------------------------------------------  I personally reviewed: [  ]EKG   [  ]CXR    [  ] CT    [  ]Other  ---------------------------------------------------------------------------  PLEASE CHECK WHEN PRESENT:     [  ]Heart Failure     [  ] Acute     [  ] Acute on Chronic     [  ] Chronic  -------------------------------------------------------------------     [  ]Diastolic [HFpEF]     [  ]Systolic [HFrEF]     [  ]Combined [HFpEF & HFrEF]     [  ]Other:  -------------------------------------------------------------------  [  ]MILAGROS     [  ]ATN     [  ]Reneal Medullary Necrosis     [  ]Renal Cortical Necrosis     [  ]Other Pathological Lesions:    [  ]CKD 1  [  ]CKD 2  [  ]CKD 3  [  ]CKD 4  [  ]CKD 5  [  ]Other  -------------------------------------------------------------------  [  ]Other/Unspecified:    --------------------------------------------------------------------    Abdominal Nutritional Status  [  ]Malnutrition: See Nutrition Note  [  ]Cachexia  [  ]Other:   [  ]Supplement Ordered:  [  ]Morbid Obesity (BMI >=40]

## 2018-03-09 NOTE — H&P ADULT - PROBLEM SELECTOR PLAN 5
HCTZ 25mg daily  Holding anti-hypertensives in setting of electrolyte abnormalities  Currently normo-tensive Holding Topirimate  Tylenol PRN  Follow up with neurologist as outpatient

## 2018-03-09 NOTE — CHART NOTE - NSCHARTNOTEFT_GEN_A_CORE
Critical Value: Repeat K 2.0  Despite receiving 110 mEq of potassium, repeat K still critically low at 2.0  Repeat EKG with QTc of 471 with non-specific t-waves  Patient given another 80mEq of PO potassium and 10mEq of IV K (due for another 20mEq IV)  Discussed possibility of a central line, however, patient would like to again try aggressive PO and peripheral K repletion   Will trend EKG and BMP's   If no improvement or change in EKG, will re-consult ICU for telemetry monitoring

## 2018-03-09 NOTE — ED PROVIDER NOTE - OBJECTIVE STATEMENT
48yo female with diarrhea 6-8 episodes for one week, watery, non bloody. No travel, sick contacts, or recent abx use. Pt denies abd pain or fever. Has mild nausea, no vomiting.

## 2018-03-09 NOTE — CONSULT NOTE ADULT - PROBLEM SELECTOR RECOMMENDATION 9
Patient with severe hypokalemia likely due to GI losses due to diarrhea and laxative use vs renal losses due to diuretic use vs primary hyperaldosteronism     Plan:  Obtain urinalysis, urine electrolytes as per ordered  Please discontinue diuretic and laxative medications for now  Obtain Plasma renin activity and serum aldosterone level  Monitor BMP every 4 hrly for now  Replete Kcl 20meq every hrly for 3 doses followed by IV NS with 30 meQ @ 100cc/hr for now.  Goal K>4.0 and Mag>2.0 at present.  Consider GI evaluation for possible endoscopy due to possible IBS vs IBD due to diarrhea/constipation and weight loss  Obtain Bilateral renal sonogram to assess for anatomy and familial disorder

## 2018-03-09 NOTE — H&P ADULT - PMH
Chronic back pain Chronic back pain    Essential hypertension    Herniated lumbar intervertebral disc    Migraine with status migrainosus, not intractable, unspecified migraine type

## 2018-03-09 NOTE — H&P ADULT - PROBLEM SELECTOR PLAN 3
> 60 lbs since October when started Topiramate. Known side effect of medication. However, can not rule out malignancy. Patient UTD on recommended screening. Last Mammo 3 years ago (normal). Last PAP 2 years ago (normal). No colonoscopies. Family hx of Lung Ca in father (dx @ 80 y/o)   - CT Abdo/Pelvis w/ PO and IV contrast  - hold Topiramate contraction alkalosis, on IVFs overnight, added KCL to NS to prevent further worseningion K+, and help w/ electrolyte repletion

## 2018-03-09 NOTE — CONSULT NOTE ADULT - PROBLEM SELECTOR RECOMMENDATION 4
Diarrhea with intermittent constipation likely due to IBS vs IBD vs small intestinal tumors  Plan:  Consider GI consult  for further evaluation

## 2018-03-09 NOTE — CONSULT NOTE ADULT - SUBJECTIVE AND OBJECTIVE BOX
HISTORY OF PRESENT ILLNESS:  47 F PMH childhood asthma, herniate disk (takes tylenol almost daily and intermittant NSAID), HTN, migraine (on topiramate), benign vocal chord nodules removed 2003, prior strep throat, prior sinus infections, cholecystectomy 1995 p/w throat pain/L ear and neck pain/abd pain/nausea/diarrhea x 1 week. Has had suppressed appetite since starting topiramte a few months ago, lost 60 lbs in past few months, good energy level throughout this time, then dirrhea/abd pain/L ear and neck pain began 1 week ago. +poor po intake and decreased urine output per pt. Having 5 watery stools daily no mucous or blood, no vaginal discharge. No fever though takes tylenol daily. No chills though always feels cold. +new SOB in past week and FLOWERS to 1 [place] of [place] that is not baseline for her. No HA/chest pain/vomiting/dysuria/cough. +tingling and numbness in legs x 1 week. No sick contacts or recent travel.      Allergies    No Known Allergies    Intolerances    	    MEDICATIONS:                  PAST MEDICAL & SURGICAL HISTORY:  Chronic back pain      FAMILY HISTORY:      SOCIAL HISTORY:    [ ] Non-smoker  [ ] Smoker  [ ] Alcohol    REVIEW OF SYSTEMS:  See HPI, otherwise complete 10 point review of systems negative    PHYSICAL EXAM:  T(C): 37.1 (03-08-18 @ 20:25), Max: 37.1 (03-08-18 @ 20:25)  HR: 97 (03-08-18 @ 20:25) (97 - 97)  BP: 125/84 (03-08-18 @ 20:25) (125/84 - 125/84)  RR: 20 (03-08-18 @ 20:25) (20 - 20)  SpO2: 97% (03-08-18 @ 20:25) (97% - 97%)  Wt(kg): --  I&O's Summary      Appearance: No Acute Distress	  HEENT:  Normal oral mucosa, PERRL, EOMI	  Cardiovascular: Normal S1 S2, No JVD, No murmurs/rubs/gallops  Respiratory: Lungs clear to auscultation bilaterally  Gastrointestinal:  Soft, Non-tender, + BS	  Skin: No rashes, No ecchymoses, No cyanosis	  Neurologic: Non-focal  Extremities: No clubbing, cyanosis or edema  Vascular: Peripheral pulses palpable 2+ bilaterally  Psychiatry: A & O x 3, Mood & affect appropriate    LABS:	 	    CBC Full  -  ( 08 Mar 2018 20:42 )  WBC Count : 6.6 K/uL  Hemoglobin : 12.6 g/dL  Hematocrit : 38.7 %  Platelet Count - Automated : 285 K/uL  Mean Cell Volume : 71.0 fL  Mean Cell Hemoglobin : 23.1 pg  Mean Cell Hemoglobin Concentration : 32.6 g/dL  Auto Neutrophil # : x  Auto Lymphocyte # : x  Auto Monocyte # : x  Auto Eosinophil # : x  Auto Basophil # : x  Auto Neutrophil % : 58.5 %  Auto Lymphocyte % : 30.2 %  Auto Monocyte % : 8.1 %  Auto Eosinophil % : 2.4 %  Auto Basophil % : 0.8 %    03-08    135  |  85<L>  |  12  ----------------------------<  109<H>  2.3<LL>   |  35<H>  |  0.95    Ca    10.1      08 Mar 2018 20:42  Mg     2.2     03-08    TPro  8.5<H>  /  Alb  4.8  /  TBili  0.5  /  DBili  x   /  AST  40  /  ALT  45  /  AlkPhos  56  03-08      proBNP:   Lipid Profile:   HgA1c:   TSH:       CARDIAC MARKERS:            TELEMETRY: 	    ECG:  	  RADIOLOGY:  OTHER: 	    PREVIOUS DIAGNOSTIC TESTING:    [ ] Echocardiogram:  [ ] Catheterization:  [ ] Stress Test: HISTORY OF PRESENT ILLNESS:  47 F PMH childhood asthma, herniate disk (takes tylenol almost daily and intermittant NSAID), HTN, migraine (on topiramate), benign vocal chord nodules removed 2003, prior strep throat, prior sinus infections, cholecystectomy 1995 p/w throat pain/L ear and neck pain/abd pain/nausea/diarrhea x 1 week. Has had suppressed appetite since starting topiramte a few months ago, lost 60 lbs in past few months, good energy level throughout this time, then dirrhea/abd pain/L ear and neck pain began 1 week ago. +poor po intake and decreased urine output per pt. Having 5 watery stools daily no mucous or blood, no vaginal discharge. No fever though takes tylenol daily. No chills though always feels cold. +new SOB in past week and FLOWERS to 1 [place] of [place] that is not baseline for her. No HA/chest pain/vomiting/dysuria/cough. +tingling and numbness in legs x 1 week. No sick contacts or recent travel.      Allergies    No Known Allergies    MEDICATIONS:  - topiramate 50 tid  - chlorthalidone 25 qd  - tylenol prn  - ibuprofen prn      PAST MEDICAL & SURGICAL HISTORY:  Chronic back pain  see above section      FAMILY HISTORY:  mother with multiple CVAs    SOCIAL HISTORY:    5 pack year smoker, quit 15 years ago, no etoh, no illicit drug use, on disability for back pain    REVIEW OF SYSTEMS:  See HPI, otherwise complete 10 point review of systems negative    PHYSICAL EXAM:  T(C): 37.1 (03-08-18 @ 20:25), Max: 37.1 (03-08-18 @ 20:25)  HR: 97 (03-08-18 @ 20:25) (97 - 97)  BP: 125/84 (03-08-18 @ 20:25) (125/84 - 125/84)  RR: 20 (03-08-18 @ 20:25) (20 - 20)  SpO2: 97% (03-08-18 @ 20:25) (97% - 97%)  Wt(kg): --  I&O's Summary      Appearance: No Acute Distress	, non toxic, unlabored breathing, appears comfortable,obese  HEENT:  dry mucosa, +oropharyngeal erythema, +white patches on oropharynx, no bulging of L tympanic membrane or ear canal erythema, no pain on pulling pinna, PERRL, EOMI	  Cardiovascular: Normal S1 S2, No JVD, No murmurs/rubs/gallops, HR 77 on monitor, regular  Respiratory: Lungs clear to auscultation bilaterally  Gastrointestinal:  Soft, Non-tender, + BS	  Skin: No rashes, No ecchymoses, No cyanosis	  Neurologic: Non-focal  Extremities: No clubbing, cyanosis or edema  Vascular: Peripheral pulses palpable 2+ bilaterally  Psychiatry: A & O x 3, Mood & affect appropriate  Lymph nodes: +tender L anterior LAD and palpable R anterior LAD        .  LABS:                         12.6   6.6   )-----------( 285      ( 08 Mar 2018 20:42 )             38.7     03-08    135  |  85<L>  |  12  ----------------------------<  109<H>  2.3<LL>   |  35<H>  |  0.95    Ca    10.1      08 Mar 2018 20:42  Mg     2.2     03-08    TPro  8.5<H>  /  Alb  4.8  /  TBili  0.5  /  DBili  x   /  AST  40  /  ALT  45  /  AlkPhos  56  03-08          RADIOLOGY, EKG & ADDITIONAL TESTS: Reviewed.     no CXR    ECG: NSR, axis wnl, T wave flattening diffusely - different from 2017 ECG, QTc 484 then 403 on repeat ECG, no acute ischemic STT changes HISTORY OF PRESENT ILLNESS:  47 F PMH childhood asthma, herniate disk (takes tylenol almost daily and intermittant NSAID), HTN, migraine (on topiramate), benign vocal chord nodules removed 2003, prior strep throat, prior sinus infections, cholecystectomy 1995 p/w throat pain/L ear and neck pain/abd pain/nausea/diarrhea x 1 week. Has had suppressed appetite since starting topiramte a few months ago, lost 60 lbs in past few months, good energy level throughout this time, then dirrhea/abd pain/L ear and neck pain began 1 week ago. +poor po intake and decreased urine output per pt. Having 5 watery stools daily no mucous or blood, no vaginal discharge. No fever though takes tylenol daily. No chills though always feels cold. +new SOB in past week and FLOWERS to 1 [place] stairs that is not baseline for her. No HA/chest pain/vomiting/dysuria/cough. +tingling and numbness in legs x 1 week. No sick contacts or recent travel. Last abx use was 2017 for sinusitis.      Allergies    No Known Allergies    MEDICATIONS:  - topiramate 50 tid  - chlorthalidone 25 qd  - tylenol prn  - ibuprofen prn      PAST MEDICAL & SURGICAL HISTORY:  Chronic back pain  see above section      FAMILY HISTORY:  mother with multiple CVAs    SOCIAL HISTORY:    5 pack year smoker, quit 15 years ago, no etoh, no illicit drug use, on disability for back pain    REVIEW OF SYSTEMS:  See HPI, otherwise complete 10 point review of systems negative    PHYSICAL EXAM:  T(C): 37.1 (03-08-18 @ 20:25), Max: 37.1 (03-08-18 @ 20:25)  HR: 97 (03-08-18 @ 20:25) (97 - 97)  BP: 125/84 (03-08-18 @ 20:25) (125/84 - 125/84)  RR: 20 (03-08-18 @ 20:25) (20 - 20)  SpO2: 97% (03-08-18 @ 20:25) (97% - 97%)  Wt(kg): --  I&O's Summary      Appearance: No Acute Distress	, non toxic, unlabored breathing, appears comfortable,obese  HEENT:  dry mucosa, +oropharyngeal erythema, +white patches on oropharynx, no bulging of L tympanic membrane or ear canal erythema, no pain on pulling pinna, PERRL, EOMI	  Cardiovascular: Normal S1 S2, No JVD, No murmurs/rubs/gallops, HR 77 on monitor, regular  Respiratory: Lungs clear to auscultation bilaterally  Gastrointestinal:  Soft, Non-tender, + BS	  Skin: No rashes, No ecchymoses, No cyanosis	  Neurologic: Non-focal  Extremities: No clubbing, cyanosis or edema  Vascular: Peripheral pulses palpable 2+ bilaterally  Psychiatry: A & O x 3, Mood & affect appropriate  Lymph nodes: +tender L anterior LAD and palpable R anterior LAD        .  LABS:                         12.6   6.6   )-----------( 285      ( 08 Mar 2018 20:42 )             38.7     03-08    135  |  85<L>  |  12  ----------------------------<  109<H>  2.3<LL>   |  35<H>  |  0.95    Ca    10.1      08 Mar 2018 20:42  Mg     2.2     03-08    TPro  8.5<H>  /  Alb  4.8  /  TBili  0.5  /  DBili  x   /  AST  40  /  ALT  45  /  AlkPhos  56  03-08          RADIOLOGY, EKG & ADDITIONAL TESTS: Reviewed.     no CXR    ECG: NSR, axis wnl, T wave flattening diffusely - different from 2017 ECG, QTc 484 then 403 on repeat ECG, no acute ischemic STT changes

## 2018-03-09 NOTE — DISCHARGE NOTE ADULT - MEDICATION SUMMARY - MEDICATIONS TO STOP TAKING
I will STOP taking the medications listed below when I get home from the hospital:    hydroCHLOROthiazide 25 mg oral tablet  -- 1 tab(s) by mouth once a day    topiramate 50 mg oral tablet  -- 1 tab(s) by mouth 2 times a day

## 2018-03-09 NOTE — DISCHARGE NOTE ADULT - CARE PLAN
Principal Discharge DX:	Hypokalemia  Secondary Diagnosis:	Diarrhea  Secondary Diagnosis:	Migraine  Secondary Diagnosis:	HTN (hypertension) Principal Discharge DX:	Hypokalemia  Goal:	Discharge home  Assessment and plan of treatment:	You were admitted to the hospital with diarrhea, but noted to have very low potassium levels, which frequently occurs in the setting of diarrhea. We gave you potassium through an IV and orally, and eventually your potassium levels did return to the normal range. Please follow up as an outpatient with your primary care doctor Bharat Booth within one week of leaving the hospital to have your levels checked again, as potassium normals outside of the normal range can cause heart problems.  Secondary Diagnosis:	Diarrhea  Assessment and plan of treatment:	Your diarrhea improved with Imodium. The cause at this time is unclear, so it is important to see your primary care doctor Bharat Booth within the next 7 days to follow up and determine whether you would need to see a specialist. If you develop fever and/or worsening diarrhea, please return to the hospital to be re-evaluated.  Secondary Diagnosis:	Migraine  Assessment and plan of treatment:	Please see your neurologist Chata Salmeron as soon as possible to discuss treatment for your migraines.  Secondary Diagnosis:	HTN (hypertension)  Assessment and plan of treatment:	Please continue your home medications. Principal Discharge DX:	Hypokalemia  Goal:	Discharge home  Assessment and plan of treatment:	You were admitted to the hospital with diarrhea, but noted to have very low potassium levels, which frequently occurs in the setting of diarrhea. We gave you potassium through an IV and orally, and eventually your potassium levels did return to the normal range. In addition, Hydrochlorothiazide can cause abnormal electrolyte levels, so we would like you to stop taking this medication at this time. Please follow up as an outpatient with your primary care doctor Bharat Booth within one week of leaving the hospital to have your levels checked again, as potassium normals outside of the normal range can cause heart problems.  Secondary Diagnosis:	Diarrhea  Assessment and plan of treatment:	Your diarrhea improved with Imodium. The cause at this time is unclear, so it is important to see your primary care doctor Bharat Booth within the next 7 days to follow up and determine whether you would need to see a specialist. If you develop fever and/or worsening diarrhea, please return to the hospital to be re-evaluated.  Secondary Diagnosis:	Migraine  Assessment and plan of treatment:	Please see your neurologist Chata Salmeron as soon as possible to discuss treatment for your migraines.  Secondary Diagnosis:	HTN (hypertension)  Assessment and plan of treatment:	Please continue your home medications. Principal Discharge DX:	Hypokalemia  Goal:	Discharge home  Assessment and plan of treatment:	You were admitted to the hospital with diarrhea, but noted to have very low potassium levels, which frequently occurs in the setting of diarrhea. We gave you potassium through an IV and orally, and eventually your potassium levels did return to the normal range. In addition, Hydrochlorothiazide can cause abnormal electrolyte levels, so we would like you to stop taking this medication at this time. Please follow up as an outpatient with your primary care doctor Bharat Booth on Monday March 19 at 10:20 AM, and BE SURE TO ASK YOUR DOCTOR TO CHECK YOUR ELECTROLYTE LEVELS, as potassium levels outside of the normal range can cause heart problems.  Secondary Diagnosis:	Diarrhea  Assessment and plan of treatment:	Your diarrhea improved with Imodium. The cause at this time is unclear, so it is important to see your primary care doctor Bharat Booth on Monday March 19 at 10:20 AM and determine whether you would need to see a specialist. If you develop fever and/or worsening diarrhea, please return to the hospital to be re-evaluated.  Secondary Diagnosis:	Migraine  Assessment and plan of treatment:	Please see your neurologist Chata Salmeron as soon as possible to discuss treatment for your migraines.  Secondary Diagnosis:	HTN (hypertension)  Assessment and plan of treatment:	Please continue your home medications. Principal Discharge DX:	Hypokalemia  Goal:	Discharge home  Assessment and plan of treatment:	You were admitted to the hospital with diarrhea, but noted to have very low potassium levels, which frequently occurs in the setting of diarrhea. We gave you potassium through an IV and orally, and eventually your potassium levels did return to the normal range. In addition, Hydrochlorothiazide can cause abnormal electrolyte levels, so we would like you to stop taking this medication at this time. Please follow up as an outpatient with your primary care doctor Bharat Booth on Monday March 19 at 10:20 AM, and BE SURE TO ASK YOUR DOCTOR TO CHECK YOUR ELECTROLYTE LEVELS, as potassium levels outside of the normal range can cause heart problems. Please have Dr. Booth refer you to a kidney doctor.  Secondary Diagnosis:	Diarrhea  Assessment and plan of treatment:	Your diarrhea improved with Imodium. The cause at this time is unclear, so it is important to see your primary care doctor Bharat Booth on Monday March 19 at 10:20 AM and determine whether you would need to see a specialist. If you develop fever and/or worsening diarrhea, please return to the hospital to be re-evaluated.  Secondary Diagnosis:	Migraine  Assessment and plan of treatment:	Please see your neurologist Chata Salmeron as soon as possible to discuss treatment for your migraines.  Secondary Diagnosis:	HTN (hypertension)  Assessment and plan of treatment:	Please continue your home medications. Principal Discharge DX:	Hypokalemia  Goal:	Discharge home  Assessment and plan of treatment:	You were admitted to the hospital with diarrhea, but noted to have very low potassium levels, which frequently occurs in the setting of diarrhea. We gave you potassium through an IV and orally, and eventually your potassium levels did return to the normal range. In addition, Hydrochlorothiazide can cause abnormal electrolyte levels, so we would like you to stop taking this medication at this time. Please follow up as an outpatient with your primary care doctor Bharat Booth on Monday March 19 at 10:20 AM, and BE SURE TO ASK YOUR DOCTOR TO CHECK YOUR ELECTROLYTE LEVELS, as potassium levels outside of the normal range can cause heart problems. Please have Dr. Booth refer you to a kidney doctor. If the scheduled appointment does not work for your schedule, please call to reschedule for a time that works to see the doctor within 7 days of leaving the hospital.  Secondary Diagnosis:	Diarrhea  Assessment and plan of treatment:	Your diarrhea improved with Imodium. The cause at this time is unclear, so it is important to see your primary care doctor Bharat Booth on Monday March 19 at 10:20 AM and determine whether you would need to see a specialist. If you develop fever and/or worsening diarrhea, please return to the hospital to be re-evaluated. If the scheduled appointment does not work for your schedule, please call to reschedule for a time that works to see the doctor within 7 days of leaving the hospital.  Secondary Diagnosis:	Migraine  Assessment and plan of treatment:	Please see your neurologist Chata Salmeron as soon as possible to discuss treatment for your migraines.  Secondary Diagnosis:	HTN (hypertension)  Assessment and plan of treatment:	Please continue your home medications.

## 2018-03-09 NOTE — CONSULT NOTE ADULT - SUBJECTIVE AND OBJECTIVE BOX
Patient is a 47y old  Female who presents with a chief complaint of Diarrhea (09 Mar 2018 15:04)      HPI:  47 year old female w/ pmhx of childhood asthma, herniate disk, HTN, migraine (on topiramate), benign vocal chord nodules removed 2003, cholecystectomy 1995 p/w multiple, non-specific symptoms, which include abdominal pain and diarrhea x 1 week. Abdominal pain described as diffuse, sharp, intermittent, worse with food. Other symptoms include throat pain/L ear and neck pain. Patient states non-specific symptoms started in October, when she starting taking Topiramate. Since then, she's been experiencing these symptoms as well as a decreased appetite w/ decreased PO intake and > 60 lb weight loss. Since last week, patient has been having 5 watery, non-bloody, stools daily. No vaginal discharge. No fever though takes tylenol daily. No chills though always feels cold. +new SOB in past week and FLOWERS to 1 [place] of [place] stairs that is not baseline for her. No HA/chest pain/vomiting/dysuria/cough. +tingling and numbness in legs x 1 week. No sick contacts or recent travel.    In ED, temp: 98.8, HR: 97, /84, RR: 20, O2: 97% room air. Labs notable for K 2.3. EKG: NSR w/ prolonged QTc. s/p total of 70mEq of K along w/ 2L NS bolus, Imodium, Zofran with symptomatic relief. ICU consulted for hypokalemia and EKG changes, however, deemed stable for RMF. (09 Mar 2018 01:32)    Patient denies any family history of low K level. She stats that she has been taking laxative pills intermittently for constipation.      PAST MEDICAL & SURGICAL HISTORY:  Herniated lumbar intervertebral disc  Migraine with status migrainosus, not intractable, unspecified migraine type  Essential hypertension  Chronic back pain  History of cholecystectomy      Allergies    No Known Allergies    Intolerances        FAMILY HISTORY:  No pertinent family history in first degree relatives      SOCIAL HISTORY:    MEDICATIONS  (STANDING):  heparin  Injectable 5000 Unit(s) SubCutaneous every 8 hours  sodium chloride 0.9% 1000 milliLiter(s) (100 mL/Hr) IV Continuous <Continuous>    MEDICATIONS  (PRN):  acetaminophen   Tablet. 325 milliGRAM(s) Oral every 4 hours PRN Moderate Pain (4 - 6)  loperamide 2 milliGRAM(s) Oral every 4 hours PRN Diarrhea      REVIEW OF SYSTEMS:    CONSTITUTIONAL: Fatigue and tiredness, No fever or chills  EYES: No blurred or double vision.  ENT: No recent URI or sore throat  RESPIRATORY: No shortness of breath, cough, hemoptysis  CARDIOVASCULAR: No Chest pain or shortness of breath, palpitations, dizziness or syncope  GASTROINTESTINAL: Intermittent abdominal discomfort, diarrhea and constipation,   GENITOURINARY: No dysuria or urinary burning, No difficulty passing urine, No hematuria  NEUROLOGICAL: No headaches or blurred vision  SKIN: No skin rashes   MUSCULOSKELETAL: No arthralgia, Joint pain, leg edema, No muscle pains        PHYSICAL EXAM:  GENERAL: NAD, well-developed, well nourished, alert, awake, no acute distress at present  HEAD:  Atraumatic, Normocephalic,   EYES: Bilateral conjuctiva and sclera normal,  Oral cavity: Oral mucosa dry and pale  NECK: Neck supple, No JVD  CHEST/LUNG: Clear to auscultation bilaterally; No wheeze, no rales, no crepitations  HEART: Regular rate and rhythm. SHAWNA II/VI at LPSB, No gallop, no rub   ABDOMEN: Soft, Nontender, non distended, BS+nt, No flank tenderness.   EXTREMITIES: No clubbing, cyanosis, or edema, no calf tenderness  Neurology: AAOx3, no focal neurological deficit  SKIN: No rashes or lesions      CAPILLARY BLOOD GLUCOSE      POCT Blood Glucose.: 104 mg/dL (09 Mar 2018 06:26)  POCT Blood Glucose.: 110 mg/dL (08 Mar 2018 20:24)      I&O's Summary        LABS:                                                   03-09-18 @ 11:04    137  |  97  |  7   ----------------------------<  106<H>  2.7<LL>   |  27  |  0.80                                                 03-09-18 @ 05:47    136  |  96  |  9   ----------------------------<  138<H>  2.9<LL>   |  29  |  0.87                                                 03-09-18 @ 02:39    139  |  93<L>  |  9   ----------------------------<  106<H>  2.0<LL>   |  33<H>  |  0.86                                                 03-09-18 @ 01:17    135  |  90<L>  |  9   ----------------------------<  111<H>  2.0<LL>   |  32<H>  |  0.89                                                 03-08-18 @ 20:42    135  |  85<L>  |  12  ----------------------------<  109<H>  2.3<LL>   |  35<H>  |  0.95    Ca    8.0<L>      09 Mar 2018 11:04  Ca    8.5      09 Mar 2018 05:47  Ca    8.6      09 Mar 2018 02:39  Ca    8.6      09 Mar 2018 01:17  Ca    10.1      08 Mar 2018 20:42  Phos  0.9     03-09  Mg     2.6     03-09  Mg     2.2     03-08    TPro  6.4  /  Alb  3.8  /  TBili  0.3  /  DBili  x   /  AST  28  /  ALT  32  /  AlkPhos  42  03-09  TPro  8.5<H>  /  Alb  4.8  /  TBili  0.5  /  DBili  x   /  AST  40  /  ALT  45  /  AlkPhos  56  03-08                          10.8   5.4   )-----------( 246      ( 09 Mar 2018 05:47 )             34.4     CBC Full  -  ( 09 Mar 2018 05:47 )  WBC Count : 5.4 K/uL  Hemoglobin : 10.8 g/dL  Hematocrit : 34.4 %  Platelet Count - Automated : 246 K/uL  Mean Cell Volume : 74.6 fL      CBC Full  -  ( 08 Mar 2018 20:42 )  WBC Count : 6.6 K/uL  Hemoglobin : 12.6 g/dL  Hematocrit : 38.7 %  Platelet Count - Automated : 285 K/uL  Mean Cell Volume : 71.0 fL        PT/INR - ( 09 Mar 2018 05:47 )   PT: 12.4 sec;   INR: 1.11                    RADIOLOGY & ADDITIONAL TESTS:    < from: Xray Chest 1 View AP -PORTABLE-Routine (07.08.17 @ 23:16) >    EXAM:  XR CHEST 1 VIEW PORT ROUTINE                          PROCEDURE DATE:  07/08/2017                     INTERPRETATION:  Clinical History: Shortness of breath    Frontal examination the chest demonstrates the heart to be within normal   limitsin transverse diameter. No acute infiltrates. Mild dextro   scoliosis thoracic spine.    Impression: No acute infiltrates            "Thank you for the opportunity to participate in the care of this   patient."        NORRIS RODRIGES M.D., ATTENDING RADIOLOGIST  This document has been electronically signed. Jul 9 2017  1:01PM                  < end of copied text >    EKG/Telemetry: Reviewed

## 2018-03-09 NOTE — H&P ADULT - NSHPPHYSICALEXAM_GEN_ALL_CORE
.  VITAL SIGNS:  T(C): 36.6 (03-09-18 @ 01:32), Max: 37.1 (03-08-18 @ 20:25)  T(F): 97.8 (03-09-18 @ 01:32), Max: 98.8 (03-08-18 @ 20:25)  HR: 97 (03-09-18 @ 01:32) (97 - 97)  BP: 114/73 (03-09-18 @ 01:32) (114/73 - 125/84)  BP(mean): --  RR: 18 (03-09-18 @ 01:32) (18 - 20)  SpO2: 98% (03-09-18 @ 01:32) (97% - 98%)  Wt(kg): --    PHYSICAL EXAM:    Constitutional: WDWN resting comfortably in bed; NAD  Head: NC/AT  Eyes: PERRL, EOMI. Acanthosis around eyes   ENT: MMM  Neck: supple; no JVD, surgican thyroid scar   Respiratory: CTA B/L; no W/R/R  Cardiac: +S1/S2; RRR; no M/R/G  Gastrointestinal: obese, soft, NT/ND; no rebound or guarding; +BSx4  Extremities: WWP, no peripheral edema  Musculoskeletal: NROM x4; no joint swelling  Dermatologic: skin warm and dry  Neurologic: AAOx3; CNII-XII grossly intact; no focal deficits  Ambulated without difficulty

## 2018-03-09 NOTE — H&P ADULT - NSHPSOCIALHISTORY_GEN_ALL_CORE
no smoking or drinking, no illicit drug abuse no smoking or drinking, no illicit drug abuse  not sexually active, not pregnant

## 2018-03-09 NOTE — CONSULT NOTE ADULT - PROBLEM SELECTOR RECOMMENDATION 9
K 2.3 on arrival in setting of diarrhea and chlorthalidone use. ECG with borderline prolonged QTc 484 on arrival, corrected to 403 on repeat ECG after initial repletion K.  - hold chlorthalidone  - replete 60 additional po potassium on top of 70 already given, Mg acceptable, check phosphate  - suspect possible strep pharyngitis vs viral URI (URI can also be topiramate side effect) which may contribute to diarrhea. Centor score 2, would check rapid strep and RVP  - give additional IV NS bolus and trend BMP for electrolytes (labs suggest contraction alkalosis and pt with dry mucosa)  - fu outpt MD regarding topiramate dosing - pt states it has not helped her migraine and may be contributing to Sx as below K 2.3 on arrival in setting of diarrhea and chlorthalidone use. ECG with borderline prolonged QTc 484 on arrival, corrected to 403 on repeat ECG after initial repletion K.  - hold chlorthalidone  - replete 60 additional po potassium on top of 70 already given, Mg acceptable, check phosphate  - suspect possible strep pharyngitis vs viral URI (URI can also be topiramate side effect) which may contribute to diarrhea. Centor score 2, would check rapid strep and RVP  - give additional IV NS bolus and trend BMP for electrolytes (labs suggest contraction alkalosis and pt with dry mucosa)  - fu outpt MD regarding topiramate dosing - pt states it has not helped her migraine and may be contributing to Sx (can cause paresthesias, diarrhea, fatigue, anorexia) K 2.3 on arrival in setting of diarrhea and chlorthalidone use. ECG with borderline prolonged QTc 484 on arrival, corrected to 403 on repeat ECG after initial repletion K.  - hold chlorthalidone  - replete 60 additional po potassium on top of 70 already given, Mg acceptable, check phosphate, check BMP q2h  - suspect possible strep pharyngitis vs viral URI (URI can also be topiramate side effect) which may contribute to diarrhea. Centor score 2, would check rapid strep and RVP  - give additional IV NS bolus and trend BMP for electrolytes (labs suggest contraction alkalosis and pt with dry mucosa)  - fu outpt MD regarding topiramate dosing - pt states it has not helped her migraine and may be contributing to Sx (can cause paresthesias, diarrhea, fatigue, anorexia)

## 2018-03-09 NOTE — CONSULT NOTE ADULT - PROBLEM SELECTOR RECOMMENDATION 2
Metabolic alkalosis now corrected with Saline infusion with bicarbonate from 35 to 27 at present likely due to diuretic use and dehydation vs primary hyperaldosteronism    Plan:  Monitor BMP every 4 hrly for now.  Obtain Plasma renin activity  and aldosterone level
Centor 2. Assessment as above. Not meeting sepsis criteria.  - RVP  - rapid strep, consider abx if indicated

## 2018-03-09 NOTE — DISCHARGE NOTE ADULT - PATIENT PORTAL LINK FT
You can access the [facility] Patient Portal, offered by [facility], by registering with the following website: [email or web address]

## 2018-03-09 NOTE — DISCHARGE NOTE ADULT - MEDICATION SUMMARY - MEDICATIONS TO TAKE
I will START or STAY ON the medications listed below when I get home from the hospital:    CeleBREX 400 mg oral capsule  -- 1 cap(s) by mouth once a day  -- Indication: For Chronic back pain    amLODIPine 10 mg oral tablet  -- 1 tab(s) by mouth once a day  -- Indication: For HTN (hypertension) I will START or STAY ON the medications listed below when I get home from the hospital:    CeleBREX 400 mg oral capsule  -- 1 cap(s) by mouth once a day  -- Indication: For Chronic back pain

## 2018-03-09 NOTE — H&P ADULT - ASSESSMENT
47 year old female w/ pmhx of childhood asthma, herniate disk, HTN, migraine (on topiramate), benign vocal chord nodules removed 2003, cholecystectomy 1995 p/w multiple, non-specific symptoms, which include abdominal pain and diarrhea x 1 week. 47 year old female w/ pmhx of childhood asthma, herniated disk, HTN, migraine (on topiramate), benign vocal chord nodules removed 2003, cholecystectomy 1995 p/w multiple, non-specific symptoms, which include abdominal pain and diarrhea x 1 week.

## 2018-03-09 NOTE — DISCHARGE NOTE ADULT - PROVIDER TOKENS
FREE:[LAST:[Akasaduba],FIRST:[Bharat],PHONE:[phone]],FAX:[(   )    -],ADDRESS:[[address] [place] [address]]],FREE:[LAST:[Jaron],FIRST:[Chata],PHONE:[phone]:[(   )    -],ADDRESS:[[place] [address]]]

## 2018-03-09 NOTE — PROGRESS NOTE ADULT - PROBLEM SELECTOR PLAN 1
cont. repletion, supportive care, monitor Mg; likely d/t diarrhea in setting of HCTZ use; however, given hx and difficulty repleting, will consult Renal for recs re: additional work-up and mgmt; holding HCTZ

## 2018-03-09 NOTE — CONSULT NOTE ADULT - PROBLEM SELECTOR RECOMMENDATION 3
Patient with low phosphate level 0.9 at present likely due to GI losses   Replete K phos 30mmol IV once for now  Monitor phosphorus level for now
No H/O DM per pt. Topiramate can cause paresthesias.  - alli outpt MD regarding topiramate dosing  - pt comfortable on exam now, pain control as needed

## 2018-03-09 NOTE — H&P ADULT - HISTORY OF PRESENT ILLNESS
47 year old female w/ pmhx of childhood asthma, herniate disk, HTN, migraine (on topiramate), benign vocal chord nodules removed 2003, cholecystectomy 1995 p/w multiple, non-specific symptoms, which include abdominal pain and diarrhea x 1 week. Abdominal pain described as diffuse, sharp, intermittent, worse with food. Other symptoms include throat pain/L ear and neck pain. Patient states non-specific symptoms started in October, when she starting taking Topiramate. Since then, she's been experiencing these symptoms as well as a decreased appetite w/ decreased PO intake and > 60 lb weight loss. Since last week, patient has been having 5 watery, non-bloody, stools daily. No vaginal discharge. No fever though takes tylenol daily. No chills though always feels cold. +new SOB in past week and FLOWERS to 1 flight of [address] stairs that is not baseline for her. No HA/chest pain/vomiting/dysuria/cough. +tingling and numbness in legs x 1 week. No sick contacts or recent travel.    In ED, temp: 98.8, HR: 97, /84, RR: 20, O2: 97% room air. Labs notable for K 2.3. EKG: NSR w/ prolonged QTc. s/p total of 70mEq of K along w/ 2L NS bolus, Imodium, Zofran with symptomatic relief. ICU consulted for hypokalemia and EKG changes, however, deemed stable for RMF.

## 2018-03-09 NOTE — H&P ADULT - NSHPREVIEWOFSYSTEMS_GEN_ALL_CORE
ROS: + diarrhea, abdo pain, weight loss, FLOWERS, extremity numbness, throat pain/L ear and neck pain  ROS negative for night sweats, chills, chest pain

## 2018-03-10 LAB
ANA TITR SER: NEGATIVE — SIGNIFICANT CHANGE UP
ANION GAP SERPL CALC-SCNC: 10 MMOL/L — SIGNIFICANT CHANGE UP (ref 5–17)
ANION GAP SERPL CALC-SCNC: 11 MMOL/L — SIGNIFICANT CHANGE UP (ref 5–17)
APPEARANCE UR: CLEAR — SIGNIFICANT CHANGE UP
BILIRUB UR-MCNC: NEGATIVE — SIGNIFICANT CHANGE UP
BUN SERPL-MCNC: 10 MG/DL — SIGNIFICANT CHANGE UP (ref 7–23)
BUN SERPL-MCNC: 10 MG/DL — SIGNIFICANT CHANGE UP (ref 7–23)
BUN SERPL-MCNC: 5 MG/DL — LOW (ref 7–23)
BUN SERPL-MCNC: 8 MG/DL — SIGNIFICANT CHANGE UP (ref 7–23)
CALCIUM SERPL-MCNC: 7.7 MG/DL — LOW (ref 8.4–10.5)
CALCIUM SERPL-MCNC: 7.9 MG/DL — LOW (ref 8.4–10.5)
CALCIUM SERPL-MCNC: 8.3 MG/DL — LOW (ref 8.4–10.5)
CALCIUM SERPL-MCNC: 8.3 MG/DL — LOW (ref 8.4–10.5)
CHLORIDE SERPL-SCNC: 101 MMOL/L — SIGNIFICANT CHANGE UP (ref 96–108)
CHLORIDE SERPL-SCNC: 103 MMOL/L — SIGNIFICANT CHANGE UP (ref 96–108)
CHLORIDE SERPL-SCNC: 104 MMOL/L — SIGNIFICANT CHANGE UP (ref 96–108)
CHLORIDE SERPL-SCNC: 105 MMOL/L — SIGNIFICANT CHANGE UP (ref 96–108)
CHLORIDE UR-SCNC: 272 MMOL/L — SIGNIFICANT CHANGE UP
CO2 SERPL-SCNC: 25 MMOL/L — SIGNIFICANT CHANGE UP (ref 22–31)
CO2 SERPL-SCNC: 25 MMOL/L — SIGNIFICANT CHANGE UP (ref 22–31)
CO2 SERPL-SCNC: 26 MMOL/L — SIGNIFICANT CHANGE UP (ref 22–31)
CO2 SERPL-SCNC: 27 MMOL/L — SIGNIFICANT CHANGE UP (ref 22–31)
COLOR SPEC: YELLOW — SIGNIFICANT CHANGE UP
CREAT ?TM UR-MCNC: 78 MG/DL — SIGNIFICANT CHANGE UP
CREAT SERPL-MCNC: 0.65 MG/DL — SIGNIFICANT CHANGE UP (ref 0.5–1.3)
CREAT SERPL-MCNC: 0.68 MG/DL — SIGNIFICANT CHANGE UP (ref 0.5–1.3)
CREAT SERPL-MCNC: 0.7 MG/DL — SIGNIFICANT CHANGE UP (ref 0.5–1.3)
CREAT SERPL-MCNC: 0.71 MG/DL — SIGNIFICANT CHANGE UP (ref 0.5–1.3)
DIFF PNL FLD: NEGATIVE — SIGNIFICANT CHANGE UP
GLUCOSE BLDC GLUCOMTR-MCNC: 101 MG/DL — HIGH (ref 70–99)
GLUCOSE BLDC GLUCOMTR-MCNC: 160 MG/DL — HIGH (ref 70–99)
GLUCOSE SERPL-MCNC: 104 MG/DL — HIGH (ref 70–99)
GLUCOSE SERPL-MCNC: 104 MG/DL — HIGH (ref 70–99)
GLUCOSE SERPL-MCNC: 117 MG/DL — HIGH (ref 70–99)
GLUCOSE SERPL-MCNC: 137 MG/DL — HIGH (ref 70–99)
GLUCOSE UR QL: NEGATIVE — SIGNIFICANT CHANGE UP
HCT VFR BLD CALC: 31.4 % — LOW (ref 34.5–45)
HGB BLD-MCNC: 10 G/DL — LOW (ref 11.5–15.5)
KETONES UR-MCNC: NEGATIVE — SIGNIFICANT CHANGE UP
LEUKOCYTE ESTERASE UR-ACNC: NEGATIVE — SIGNIFICANT CHANGE UP
MAGNESIUM SERPL-MCNC: 2.1 MG/DL — SIGNIFICANT CHANGE UP (ref 1.6–2.6)
MAGNESIUM SERPL-MCNC: 2.1 MG/DL — SIGNIFICANT CHANGE UP (ref 1.6–2.6)
MAGNESIUM SERPL-MCNC: 2.2 MG/DL — SIGNIFICANT CHANGE UP (ref 1.6–2.6)
MCHC RBC-ENTMCNC: 23.6 PG — LOW (ref 27–34)
MCHC RBC-ENTMCNC: 31.8 G/DL — LOW (ref 32–36)
MCV RBC AUTO: 74.2 FL — LOW (ref 80–100)
NITRITE UR-MCNC: NEGATIVE — SIGNIFICANT CHANGE UP
OSMOLALITY UR: 745 MOSMOL/KG — HIGH (ref 100–650)
PH UR: 6 — SIGNIFICANT CHANGE UP (ref 5–8)
PHOSPHATE SERPL-MCNC: 1.8 MG/DL — LOW (ref 2.5–4.5)
PHOSPHATE SERPL-MCNC: 2.4 MG/DL — LOW (ref 2.5–4.5)
PLATELET # BLD AUTO: 254 K/UL — SIGNIFICANT CHANGE UP (ref 150–400)
POTASSIUM SERPL-MCNC: 3.2 MMOL/L — LOW (ref 3.5–5.3)
POTASSIUM SERPL-MCNC: 3.4 MMOL/L — LOW (ref 3.5–5.3)
POTASSIUM SERPL-SCNC: 3.2 MMOL/L — LOW (ref 3.5–5.3)
POTASSIUM SERPL-SCNC: 3.4 MMOL/L — LOW (ref 3.5–5.3)
POTASSIUM UR-SCNC: 55 MMOL/L — SIGNIFICANT CHANGE UP
PROT ?TM UR-MCNC: 8 MG/DL — SIGNIFICANT CHANGE UP (ref 0–12)
PROT UR-MCNC: NEGATIVE MG/DL — SIGNIFICANT CHANGE UP
PROT/CREAT UR-RTO: 0.1 RATIO — SIGNIFICANT CHANGE UP (ref 0–0.2)
RBC # BLD: 4.23 M/UL — SIGNIFICANT CHANGE UP (ref 3.8–5.2)
RBC # FLD: 14.7 % — SIGNIFICANT CHANGE UP (ref 10.3–16.9)
SODIUM SERPL-SCNC: 137 MMOL/L — SIGNIFICANT CHANGE UP (ref 135–145)
SODIUM SERPL-SCNC: 139 MMOL/L — SIGNIFICANT CHANGE UP (ref 135–145)
SODIUM SERPL-SCNC: 141 MMOL/L — SIGNIFICANT CHANGE UP (ref 135–145)
SODIUM SERPL-SCNC: 142 MMOL/L — SIGNIFICANT CHANGE UP (ref 135–145)
SODIUM UR-SCNC: 215 MMOL/L — SIGNIFICANT CHANGE UP
SP GR SPEC: 1.02 — SIGNIFICANT CHANGE UP (ref 1–1.03)
TTG IGA SER-ACNC: <5 UNITS — SIGNIFICANT CHANGE UP
TTG IGA SER-ACNC: NEGATIVE — SIGNIFICANT CHANGE UP
TTG IGG SER IA-ACNC: NEGATIVE — SIGNIFICANT CHANGE UP
TTG IGG SER-ACNC: <5 UNITS — SIGNIFICANT CHANGE UP
UROBILINOGEN FLD QL: 0.2 E.U./DL — SIGNIFICANT CHANGE UP
WBC # BLD: 4.9 K/UL — SIGNIFICANT CHANGE UP (ref 3.8–10.5)
WBC # FLD AUTO: 4.9 K/UL — SIGNIFICANT CHANGE UP (ref 3.8–10.5)

## 2018-03-10 PROCEDURE — 99233 SBSQ HOSP IP/OBS HIGH 50: CPT | Mod: GC

## 2018-03-10 RX ORDER — POTASSIUM PHOSPHATE, MONOBASIC POTASSIUM PHOSPHATE, DIBASIC 236; 224 MG/ML; MG/ML
30 INJECTION, SOLUTION INTRAVENOUS ONCE
Qty: 0 | Refills: 0 | Status: COMPLETED | OUTPATIENT
Start: 2018-03-10 | End: 2018-03-10

## 2018-03-10 RX ORDER — POTASSIUM CHLORIDE 20 MEQ
40 PACKET (EA) ORAL ONCE
Qty: 0 | Refills: 0 | Status: COMPLETED | OUTPATIENT
Start: 2018-03-10 | End: 2018-03-10

## 2018-03-10 RX ORDER — POTASSIUM CHLORIDE 20 MEQ
40 PACKET (EA) ORAL EVERY 4 HOURS
Qty: 0 | Refills: 0 | Status: COMPLETED | OUTPATIENT
Start: 2018-03-10 | End: 2018-03-10

## 2018-03-10 RX ORDER — POTASSIUM CHLORIDE 20 MEQ
10 PACKET (EA) ORAL
Qty: 0 | Refills: 0 | Status: COMPLETED | OUTPATIENT
Start: 2018-03-10 | End: 2018-03-10

## 2018-03-10 RX ADMIN — Medication 100 MILLIEQUIVALENT(S): at 05:13

## 2018-03-10 RX ADMIN — Medication 325 MILLIGRAM(S): at 11:53

## 2018-03-10 RX ADMIN — Medication 40 MILLIEQUIVALENT(S): at 21:50

## 2018-03-10 RX ADMIN — Medication 325 MILLIGRAM(S): at 09:38

## 2018-03-10 RX ADMIN — HEPARIN SODIUM 5000 UNIT(S): 5000 INJECTION INTRAVENOUS; SUBCUTANEOUS at 15:06

## 2018-03-10 RX ADMIN — SODIUM CHLORIDE 100 MILLILITER(S): 9 INJECTION, SOLUTION INTRAVENOUS at 02:25

## 2018-03-10 RX ADMIN — Medication 40 MILLIEQUIVALENT(S): at 19:00

## 2018-03-10 RX ADMIN — Medication 40 MILLIEQUIVALENT(S): at 05:12

## 2018-03-10 RX ADMIN — Medication 40 MILLIEQUIVALENT(S): at 15:05

## 2018-03-10 RX ADMIN — Medication 325 MILLIGRAM(S): at 05:12

## 2018-03-10 RX ADMIN — Medication 325 MILLIGRAM(S): at 22:01

## 2018-03-10 RX ADMIN — Medication 325 MILLIGRAM(S): at 07:42

## 2018-03-10 RX ADMIN — Medication 40 MILLIEQUIVALENT(S): at 19:38

## 2018-03-10 RX ADMIN — Medication 100 MILLIEQUIVALENT(S): at 09:35

## 2018-03-10 RX ADMIN — POTASSIUM PHOSPHATE, MONOBASIC POTASSIUM PHOSPHATE, DIBASIC 85 MILLIMOLE(S): 236; 224 INJECTION, SOLUTION INTRAVENOUS at 22:42

## 2018-03-10 RX ADMIN — SODIUM CHLORIDE 100 MILLILITER(S): 9 INJECTION, SOLUTION INTRAVENOUS at 05:13

## 2018-03-10 RX ADMIN — Medication 100 MILLIEQUIVALENT(S): at 07:08

## 2018-03-10 RX ADMIN — Medication 62.5 MILLIMOLE(S): at 11:44

## 2018-03-10 NOTE — PROGRESS NOTE ADULT - PROBLEM SELECTOR PLAN 6
on  chlorthalidone daily  Holding anti-hypertensives in setting of electrolyte abnormalities; may need another agent in place of thiazide   Currently normo-tensive

## 2018-03-10 NOTE — PROGRESS NOTE ADULT - PROBLEM SELECTOR PLAN 3
contraction alkalosis, on IVFs overnight, added KCL to NS to prevent further worseningion K+, and help w/ electrolyte repletion contraction alkalosis, on IVFs overnight, added KCL to NS to prevent further worseningion K+   -f/u renal recs

## 2018-03-10 NOTE — PROGRESS NOTE ADULT - SUBJECTIVE AND OBJECTIVE BOX
INTERVAL HPI/OVERNIGHT EVENTS:    SUBJECTIVE: Patient seen and examined at bedside.    OBJECTIVE:    VITAL SIGNS:  ICU Vital Signs Last 24 Hrs  T(C): 36.5 (10 Mar 2018 08:39), Max: 36.9 (09 Mar 2018 22:09)  T(F): 97.7 (10 Mar 2018 08:39), Max: 98.5 (09 Mar 2018 22:09)  HR: 72 (10 Mar 2018 08:39) (72 - 106)  BP: 105/73 (10 Mar 2018 08:39) (95/69 - 105/73)  BP(mean): 77 (09 Mar 2018 19:57) (77 - 77)  ABP: --  ABP(mean): --  RR: 17 (10 Mar 2018 08:39) (17 - 19)  SpO2: 100% (10 Mar 2018 08:39) (95% - 100%)        03-09 @ 07:01  -  03-10 @ 07:00  --------------------------------------------------------  IN: 800 mL / OUT: 0 mL / NET: 800 mL      CAPILLARY BLOOD GLUCOSE      POCT Blood Glucose.: 104 mg/dL (09 Mar 2018 06:26)      PHYSICAL EXAM:    General: NAD  HEENT: NC/AT; PERRL, clear conjunctiva  Neck: supple  Respiratory: CTA b/l  Cardiovascular: +S1/S2; RRR  Abdomen: soft, NT/ND; +BS x4  Extremities: WWP, 2+ peripheral pulses b/l; no LE edema  Skin: normal color and turgor; no rash  Neurological:     MEDICATIONS:  MEDICATIONS  (STANDING):  heparin  Injectable 5000 Unit(s) SubCutaneous every 8 hours  sodium chloride 0.9% 1000 milliLiter(s) (100 mL/Hr) IV Continuous <Continuous>    MEDICATIONS  (PRN):  acetaminophen   Tablet. 325 milliGRAM(s) Oral every 4 hours PRN Moderate Pain (4 - 6)  loperamide 2 milliGRAM(s) Oral every 4 hours PRN Diarrhea      ALLERGIES:  Allergies    No Known Allergies    Intolerances        LABS:                        10.0   4.9   )-----------( 254      ( 10 Mar 2018 05:37 )             31.4     03-10    139  |  103  |  10  ----------------------------<  104<H>  3.2<L>   |  26  |  0.71    Ca    7.9<L>      10 Mar 2018 05:36  Phos  2.4     03-10  Mg     2.1     03-10    TPro  6.4  /  Alb  3.8  /  TBili  0.3  /  DBili  x   /  AST  28  /  ALT  32  /  AlkPhos  42  03-09    PT/INR - ( 09 Mar 2018 05:47 )   PT: 12.4 sec;   INR: 1.11                RADIOLOGY & ADDITIONAL TESTS: Reviewed. INTERVAL HPI/OVERNIGHT EVENTS: 2AM K was 3.4. Ordered Klor 40 and 3 runs of K10meq    SUBJECTIVE: Patient seen and examined at bedside. Diarrhea has resolved. Reports palpitations once overnight and chest discomfort. Has migraine with 10/10 pain. No fever, chills, n/v or diarrhea.     OBJECTIVE:    VITAL SIGNS:  ICU Vital Signs Last 24 Hrs  T(C): 36.5 (10 Mar 2018 08:39), Max: 36.9 (09 Mar 2018 22:09)  T(F): 97.7 (10 Mar 2018 08:39), Max: 98.5 (09 Mar 2018 22:09)  HR: 72 (10 Mar 2018 08:39) (72 - 106)  BP: 105/73 (10 Mar 2018 08:39) (95/69 - 105/73)  BP(mean): 77 (09 Mar 2018 19:57) (77 - 77)  ABP: --  ABP(mean): --  RR: 17 (10 Mar 2018 08:39) (17 - 19)  SpO2: 100% (10 Mar 2018 08:39) (95% - 100%)        03-09 @ 07:01  -  03-10 @ 07:00  --------------------------------------------------------  IN: 800 mL / OUT: 0 mL / NET: 800 mL      CAPILLARY BLOOD GLUCOSE      POCT Blood Glucose.: 104 mg/dL (09 Mar 2018 06:26)      PHYSICAL EXAM:    General: NAD  HEENT: NC/AT; PERRL, clear conjunctiva  Neck: supple  Respiratory: CTA b/l  Cardiovascular: +S1/S2; RRR  Abdomen: soft, NT/ND; +BS x4  Extremities: WWP, 2+ peripheral pulses b/l; no LE edema  Skin: normal color and turgor; no rash  Neurological: AAOx3, no focal deficits.     MEDICATIONS:  MEDICATIONS  (STANDING):  heparin  Injectable 5000 Unit(s) SubCutaneous every 8 hours  sodium chloride 0.9% 1000 milliLiter(s) (100 mL/Hr) IV Continuous <Continuous>    MEDICATIONS  (PRN):  acetaminophen   Tablet. 325 milliGRAM(s) Oral every 4 hours PRN Moderate Pain (4 - 6)  loperamide 2 milliGRAM(s) Oral every 4 hours PRN Diarrhea      ALLERGIES:  Allergies    No Known Allergies    Intolerances        LABS:                        10.0   4.9   )-----------( 254      ( 10 Mar 2018 05:37 )             31.4     03-10    139  |  103  |  10  ----------------------------<  104<H>  3.2<L>   |  26  |  0.71    Ca    7.9<L>      10 Mar 2018 05:36  Phos  2.4     03-10  Mg     2.1     03-10    TPro  6.4  /  Alb  3.8  /  TBili  0.3  /  DBili  x   /  AST  28  /  ALT  32  /  AlkPhos  42  03-09    PT/INR - ( 09 Mar 2018 05:47 )   PT: 12.4 sec;   INR: 1.11                RADIOLOGY & ADDITIONAL TESTS: Reviewed.

## 2018-03-10 NOTE — PROGRESS NOTE ADULT - PROBLEM SELECTOR PLAN 10
IVF @ 100cc/hr   aggressive electrolyte repletion   regular diet    FULL CODE    Dispo: [facility] IVF @ 100cc/hr  with KCl 40meq  aggressive electrolyte repletion   regular diet    FULL CODE    Dispo: [facility]

## 2018-03-10 NOTE — PROGRESS NOTE ADULT - PROBLEM SELECTOR PLAN 4
> 60 lbs since October when started Topiramate. Known side effect of medication. However, can not rule out malignancy. Patient UTD on recommended screening. Last Mammo 3 years ago (normal). Last PAP 2 years ago (normal). No colonoscopies. Family hx of Lung Ca in father (dx @ 80 y/o)   - hold Topiramate; follow up w/ PCP and neurology for further evaluation

## 2018-03-10 NOTE — PROGRESS NOTE ADULT - PROBLEM SELECTOR PLAN 1
K 2.3 w/ prolonged Qtc 484, corrected after repletion. s/p 70mEq in ED. Gave another 40mEq. Likely related to persistent diarrhea and thaizide use.   - Aggressive Electrolyte repletion w/ PO and IV K  - Hold HCTZ; currently normo-tensive   - trend BMP q6h  - repeat EKG in AM K 2.3 w/ prolonged Qtc 484, corrected after repletion. s/p 70mEq in ED. Gave another 40mEq. Likely related to persistent diarrhea and thaizide use.   - Aggressive Electrolyte repletion w/ PO and IV K  - Hold HCTZ; currently normo-tensive   - trend BMP q6h  -f/u renal recs

## 2018-03-10 NOTE — PROGRESS NOTE ADULT - PROBLEM SELECTOR PROBLEM 8
Obesity due to excess calories, unspecified classification, unspecified whether serious comorbidity present

## 2018-03-10 NOTE — PROGRESS NOTE ADULT - PROBLEM SELECTOR PLAN 2
Loose, watery, non-bloody yellow-brown color x 1 week. Etiology unclear, could be side effect of topiramate vs infectious vs autoimmune  - CT abdo/pain w/ PO and IV contrast   - stool culture, ova/parasites, GI PCR panel  - stool osm/Na/K to determine osmolar gap   - tissuetransglut Ab  - DOTTY/ESR/CRP  - c/w IVF @ 100cc/hr  - Imodium and Zofran PRN

## 2018-03-11 LAB
ANION GAP SERPL CALC-SCNC: 11 MMOL/L — SIGNIFICANT CHANGE UP (ref 5–17)
ANION GAP SERPL CALC-SCNC: 8 MMOL/L — SIGNIFICANT CHANGE UP (ref 5–17)
BUN SERPL-MCNC: 7 MG/DL — SIGNIFICANT CHANGE UP (ref 7–23)
BUN SERPL-MCNC: 8 MG/DL — SIGNIFICANT CHANGE UP (ref 7–23)
CALCIUM SERPL-MCNC: 8.1 MG/DL — LOW (ref 8.4–10.5)
CALCIUM SERPL-MCNC: 8.2 MG/DL — LOW (ref 8.4–10.5)
CHLORIDE SERPL-SCNC: 104 MMOL/L — SIGNIFICANT CHANGE UP (ref 96–108)
CHLORIDE SERPL-SCNC: 107 MMOL/L — SIGNIFICANT CHANGE UP (ref 96–108)
CO2 SERPL-SCNC: 23 MMOL/L — SIGNIFICANT CHANGE UP (ref 22–31)
CO2 SERPL-SCNC: 26 MMOL/L — SIGNIFICANT CHANGE UP (ref 22–31)
COLLECT DURATION TIME UR: 24 HR — SIGNIFICANT CHANGE UP
CREAT SERPL-MCNC: 0.79 MG/DL — SIGNIFICANT CHANGE UP (ref 0.5–1.3)
CREAT SERPL-MCNC: 0.8 MG/DL — SIGNIFICANT CHANGE UP (ref 0.5–1.3)
GLUCOSE BLDC GLUCOMTR-MCNC: 101 MG/DL — HIGH (ref 70–99)
GLUCOSE BLDC GLUCOMTR-MCNC: 146 MG/DL — HIGH (ref 70–99)
GLUCOSE BLDC GLUCOMTR-MCNC: 312 MG/DL — HIGH (ref 70–99)
GLUCOSE BLDC GLUCOMTR-MCNC: 91 MG/DL — SIGNIFICANT CHANGE UP (ref 70–99)
GLUCOSE BLDC GLUCOMTR-MCNC: 92 MG/DL — SIGNIFICANT CHANGE UP (ref 70–99)
GLUCOSE SERPL-MCNC: 104 MG/DL — HIGH (ref 70–99)
GLUCOSE SERPL-MCNC: 92 MG/DL — SIGNIFICANT CHANGE UP (ref 70–99)
MAGNESIUM SERPL-MCNC: 2.1 MG/DL — SIGNIFICANT CHANGE UP (ref 1.6–2.6)
PHOSPHATE SERPL-MCNC: 2.3 MG/DL — LOW (ref 2.5–4.5)
POTASSIUM 24H UR-MRATE: 85 MMOL/24HR — SIGNIFICANT CHANGE UP (ref 25–125)
POTASSIUM SERPL-MCNC: 3.9 MMOL/L — SIGNIFICANT CHANGE UP (ref 3.5–5.3)
POTASSIUM SERPL-MCNC: 4 MMOL/L — SIGNIFICANT CHANGE UP (ref 3.5–5.3)
POTASSIUM SERPL-SCNC: 3.9 MMOL/L — SIGNIFICANT CHANGE UP (ref 3.5–5.3)
POTASSIUM SERPL-SCNC: 4 MMOL/L — SIGNIFICANT CHANGE UP (ref 3.5–5.3)
SODIUM SERPL-SCNC: 138 MMOL/L — SIGNIFICANT CHANGE UP (ref 135–145)
SODIUM SERPL-SCNC: 141 MMOL/L — SIGNIFICANT CHANGE UP (ref 135–145)
TOTAL VOLUME - 24 HOUR: 2300 ML — SIGNIFICANT CHANGE UP
URINE CREATININE CALCULATION: 1.5 G/24 H — SIGNIFICANT CHANGE UP (ref 0.8–1.8)

## 2018-03-11 PROCEDURE — 76770 US EXAM ABDO BACK WALL COMP: CPT | Mod: 26

## 2018-03-11 PROCEDURE — 99232 SBSQ HOSP IP/OBS MODERATE 35: CPT

## 2018-03-11 RX ORDER — POTASSIUM CHLORIDE 20 MEQ
20 PACKET (EA) ORAL ONCE
Qty: 0 | Refills: 0 | Status: COMPLETED | OUTPATIENT
Start: 2018-03-11 | End: 2018-03-11

## 2018-03-11 RX ORDER — POTASSIUM PHOSPHATE, MONOBASIC POTASSIUM PHOSPHATE, DIBASIC 236; 224 MG/ML; MG/ML
15 INJECTION, SOLUTION INTRAVENOUS ONCE
Qty: 0 | Refills: 0 | Status: COMPLETED | OUTPATIENT
Start: 2018-03-11 | End: 2018-03-11

## 2018-03-11 RX ORDER — CELECOXIB 200 MG/1
200 CAPSULE ORAL DAILY
Qty: 0 | Refills: 0 | Status: DISCONTINUED | OUTPATIENT
Start: 2018-03-11 | End: 2018-03-12

## 2018-03-11 RX ORDER — ACETAMINOPHEN 500 MG
650 TABLET ORAL EVERY 6 HOURS
Qty: 0 | Refills: 0 | Status: DISCONTINUED | OUTPATIENT
Start: 2018-03-11 | End: 2018-03-12

## 2018-03-11 RX ORDER — ACETAMINOPHEN 500 MG
325 TABLET ORAL EVERY 6 HOURS
Qty: 0 | Refills: 0 | Status: DISCONTINUED | OUTPATIENT
Start: 2018-03-11 | End: 2018-03-12

## 2018-03-11 RX ADMIN — CELECOXIB 200 MILLIGRAM(S): 200 CAPSULE ORAL at 12:18

## 2018-03-11 RX ADMIN — SODIUM CHLORIDE 100 MILLILITER(S): 9 INJECTION, SOLUTION INTRAVENOUS at 04:46

## 2018-03-11 RX ADMIN — Medication 650 MILLIGRAM(S): at 04:45

## 2018-03-11 RX ADMIN — CELECOXIB 200 MILLIGRAM(S): 200 CAPSULE ORAL at 11:19

## 2018-03-11 RX ADMIN — HEPARIN SODIUM 5000 UNIT(S): 5000 INJECTION INTRAVENOUS; SUBCUTANEOUS at 14:40

## 2018-03-11 RX ADMIN — Medication 20 MILLIEQUIVALENT(S): at 18:08

## 2018-03-11 RX ADMIN — Medication 325 MILLIGRAM(S): at 00:37

## 2018-03-11 RX ADMIN — Medication 650 MILLIGRAM(S): at 05:26

## 2018-03-11 RX ADMIN — POTASSIUM PHOSPHATE, MONOBASIC POTASSIUM PHOSPHATE, DIBASIC 62.5 MILLIMOLE(S): 236; 224 INJECTION, SOLUTION INTRAVENOUS at 06:00

## 2018-03-11 NOTE — PROGRESS NOTE ADULT - PROBLEM SELECTOR PLAN 1
cont. repletion, supportive care, monitor Mg; likely d/t diarrhea in setting of HCTZ use; appreciate Renal recs, renin and aldosterone levels pending, collecting 24-hour urine

## 2018-03-11 NOTE — PROGRESS NOTE ADULT - SUBJECTIVE AND OBJECTIVE BOX
Patient is a 47y old  Female who presents with a chief complaint of Diarrhea (09 Mar 2018 15:04)      INTERVAL HPI/OVERNIGHT EVENTS:    Pt. seen and examined at 3:30PM  Pt.'s family at bedside  Pt. feels well; no diarrhea; tolerating P.O.  chronic migraine HA and LBP controlled w/ rx    Review of Systems: 12 point review of systems otherwise negative    MEDICATIONS  (STANDING):  celecoxib 200 milliGRAM(s) Oral daily  heparin  Injectable 5000 Unit(s) SubCutaneous every 8 hours  potassium chloride    Tablet ER 20 milliEquivalent(s) Oral once  sodium chloride 0.9% 1000 milliLiter(s) (100 mL/Hr) IV Continuous <Continuous>    MEDICATIONS  (PRN):  acetaminophen   Tablet. 650 milliGRAM(s) Oral every 6 hours PRN Moderate Pain (4 - 6)  acetaminophen   Tablet. 325 milliGRAM(s) Oral every 6 hours PRN Mild Pain (1 - 3)  loperamide 2 milliGRAM(s) Oral every 4 hours PRN Diarrhea      Allergies    No Known Allergies    Intolerances          Vital Signs Last 24 Hrs  T(C): 36.8 (11 Mar 2018 15:37), Max: 37 (11 Mar 2018 05:37)  T(F): 98.3 (11 Mar 2018 15:37), Max: 98.6 (11 Mar 2018 05:37)  HR: 75 (11 Mar 2018 15:37) (75 - 89)  BP: 102/60 (11 Mar 2018 15:37) (102/60 - 134/85)  BP(mean): 74 (11 Mar 2018 15:37) (74 - 74)  RR: 18 (11 Mar 2018 15:37) (16 - 19)  SpO2: 98% (11 Mar 2018 15:37) (97% - 99%)  CAPILLARY BLOOD GLUCOSE      POCT Blood Glucose.: 91 mg/dL (11 Mar 2018 12:11)  POCT Blood Glucose.: 312 mg/dL (11 Mar 2018 12:09)  POCT Blood Glucose.: 92 mg/dL (11 Mar 2018 08:19)  POCT Blood Glucose.: 101 mg/dL (10 Mar 2018 21:58)        Physical Exam:  (at 3:30PM)  Daily     Daily   General:  well-appearing in NAD  HEENT: MMM  Neuro:  AAOx3    LABS:                        10.0   4.9   )-----------( 254      ( 10 Mar 2018 05:37 )             31.4     03-11    138  |  104  |  8   ----------------------------<  104<H>  3.9   |  23  |  0.79    Ca    8.2<L>      11 Mar 2018 17:33  Phos  2.3       Mg     2.1     -11        Urinalysis Basic - ( 10 Mar 2018 09:06 )    Color: Yellow / Appearance: Clear / S.025 / pH: x  Gluc: x / Ketone: NEGATIVE  / Bili: Negative / Urobili: 0.2 E.U./dL   Blood: x / Protein: NEGATIVE mg/dL / Nitrite: NEGATIVE   Leuk Esterase: NEGATIVE / RBC: x / WBC x   Sq Epi: x / Non Sq Epi: x / Bacteria: x          RADIOLOGY & ADDITIONAL TESTS:    ---------------------------------------------------------------------------  I personally reviewed: [  ]EKG   [  ]CXR    [  ] CT    [  ]Other  ---------------------------------------------------------------------------  PLEASE CHECK WHEN PRESENT:     [  ]Heart Failure     [  ] Acute     [  ] Acute on Chronic     [  ] Chronic  -------------------------------------------------------------------     [  ]Diastolic [HFpEF]     [  ]Systolic [HFrEF]     [  ]Combined [HFpEF & HFrEF]     [  ]Other:  -------------------------------------------------------------------  [  ]MILAGROS     [  ]ATN     [  ]Reneal Medullary Necrosis     [  ]Renal Cortical Necrosis     [  ]Other Pathological Lesions:    [  ]CKD 1  [  ]CKD 2  [  ]CKD 3  [  ]CKD 4  [  ]CKD 5  [  ]Other  -------------------------------------------------------------------  [  ]Other/Unspecified:    --------------------------------------------------------------------    Abdominal Nutritional Status  [  ]Malnutrition: See Nutrition Note  [  ]Cachexia  [  ]Other:   [  ]Supplement Ordered:  [  ]Morbid Obesity (BMI >=40]

## 2018-03-12 VITALS
TEMPERATURE: 98 F | HEART RATE: 82 BPM | OXYGEN SATURATION: 98 % | SYSTOLIC BLOOD PRESSURE: 127 MMHG | RESPIRATION RATE: 16 BRPM | DIASTOLIC BLOOD PRESSURE: 76 MMHG

## 2018-03-12 LAB
ALDOST SERPL-MCNC: 11 NG/DL — SIGNIFICANT CHANGE UP
ANION GAP SERPL CALC-SCNC: 9 MMOL/L — SIGNIFICANT CHANGE UP (ref 5–17)
BUN SERPL-MCNC: 10 MG/DL — SIGNIFICANT CHANGE UP (ref 7–23)
CALCIUM SERPL-MCNC: 8 MG/DL — LOW (ref 8.4–10.5)
CHLORIDE SERPL-SCNC: 109 MMOL/L — HIGH (ref 96–108)
CO2 SERPL-SCNC: 21 MMOL/L — LOW (ref 22–31)
CREAT SERPL-MCNC: 0.6 MG/DL — SIGNIFICANT CHANGE UP (ref 0.5–1.3)
CULTURE RESULTS: SIGNIFICANT CHANGE UP
GLUCOSE SERPL-MCNC: 93 MG/DL — SIGNIFICANT CHANGE UP (ref 70–99)
HCT VFR BLD CALC: 29.3 % — LOW (ref 34.5–45)
HGB BLD-MCNC: 9.1 G/DL — LOW (ref 11.5–15.5)
MAGNESIUM SERPL-MCNC: 1.8 MG/DL — SIGNIFICANT CHANGE UP (ref 1.6–2.6)
MCHC RBC-ENTMCNC: 23.3 PG — LOW (ref 27–34)
MCHC RBC-ENTMCNC: 31.1 G/DL — LOW (ref 32–36)
MCV RBC AUTO: 75.1 FL — LOW (ref 80–100)
PLATELET # BLD AUTO: 232 K/UL — SIGNIFICANT CHANGE UP (ref 150–400)
POTASSIUM SERPL-MCNC: 4.1 MMOL/L — SIGNIFICANT CHANGE UP (ref 3.5–5.3)
POTASSIUM SERPL-SCNC: 4.1 MMOL/L — SIGNIFICANT CHANGE UP (ref 3.5–5.3)
RBC # BLD: 3.9 M/UL — SIGNIFICANT CHANGE UP (ref 3.8–5.2)
RBC # FLD: 15.3 % — SIGNIFICANT CHANGE UP (ref 10.3–16.9)
SODIUM SERPL-SCNC: 139 MMOL/L — SIGNIFICANT CHANGE UP (ref 135–145)
SPECIMEN SOURCE: SIGNIFICANT CHANGE UP
WBC # BLD: 4.7 K/UL — SIGNIFICANT CHANGE UP (ref 3.8–10.5)
WBC # FLD AUTO: 4.7 K/UL — SIGNIFICANT CHANGE UP (ref 3.8–10.5)

## 2018-03-12 PROCEDURE — 82962 GLUCOSE BLOOD TEST: CPT

## 2018-03-12 PROCEDURE — 96374 THER/PROPH/DIAG INJ IV PUSH: CPT

## 2018-03-12 PROCEDURE — 85610 PROTHROMBIN TIME: CPT

## 2018-03-12 PROCEDURE — 83935 ASSAY OF URINE OSMOLALITY: CPT

## 2018-03-12 PROCEDURE — 83690 ASSAY OF LIPASE: CPT

## 2018-03-12 PROCEDURE — 85025 COMPLETE CBC W/AUTO DIFF WBC: CPT

## 2018-03-12 PROCEDURE — 86850 RBC ANTIBODY SCREEN: CPT

## 2018-03-12 PROCEDURE — 83735 ASSAY OF MAGNESIUM: CPT

## 2018-03-12 PROCEDURE — 82436 ASSAY OF URINE CHLORIDE: CPT

## 2018-03-12 PROCEDURE — 84156 ASSAY OF PROTEIN URINE: CPT

## 2018-03-12 PROCEDURE — 96375 TX/PRO/DX INJ NEW DRUG ADDON: CPT

## 2018-03-12 PROCEDURE — 80048 BASIC METABOLIC PNL TOTAL CA: CPT

## 2018-03-12 PROCEDURE — 84132 ASSAY OF SERUM POTASSIUM: CPT

## 2018-03-12 PROCEDURE — 86364 TISS TRNSGLTMNASE EA IG CLAS: CPT

## 2018-03-12 PROCEDURE — 84133 ASSAY OF URINE POTASSIUM: CPT

## 2018-03-12 PROCEDURE — 99239 HOSP IP/OBS DSCHRG MGMT >30: CPT

## 2018-03-12 PROCEDURE — 86900 BLOOD TYPING SEROLOGIC ABO: CPT

## 2018-03-12 PROCEDURE — 86038 ANTINUCLEAR ANTIBODIES: CPT

## 2018-03-12 PROCEDURE — 83036 HEMOGLOBIN GLYCOSYLATED A1C: CPT

## 2018-03-12 PROCEDURE — 82088 ASSAY OF ALDOSTERONE: CPT

## 2018-03-12 PROCEDURE — 81003 URINALYSIS AUTO W/O SCOPE: CPT

## 2018-03-12 PROCEDURE — 84443 ASSAY THYROID STIM HORMONE: CPT

## 2018-03-12 PROCEDURE — 99285 EMERGENCY DEPT VISIT HI MDM: CPT | Mod: 25

## 2018-03-12 PROCEDURE — 76770 US EXAM ABDO BACK WALL COMP: CPT

## 2018-03-12 PROCEDURE — 85027 COMPLETE CBC AUTOMATED: CPT

## 2018-03-12 PROCEDURE — 87046 STOOL CULTR AEROBIC BACT EA: CPT

## 2018-03-12 PROCEDURE — 86140 C-REACTIVE PROTEIN: CPT

## 2018-03-12 PROCEDURE — 87507 IADNA-DNA/RNA PROBE TQ 12-25: CPT

## 2018-03-12 PROCEDURE — 84244 ASSAY OF RENIN: CPT

## 2018-03-12 PROCEDURE — 84999 UNLISTED CHEMISTRY PROCEDURE: CPT

## 2018-03-12 PROCEDURE — 86901 BLOOD TYPING SEROLOGIC RH(D): CPT

## 2018-03-12 PROCEDURE — 84702 CHORIONIC GONADOTROPIN TEST: CPT

## 2018-03-12 PROCEDURE — 93005 ELECTROCARDIOGRAM TRACING: CPT

## 2018-03-12 PROCEDURE — 84302 ASSAY OF SWEAT SODIUM: CPT

## 2018-03-12 PROCEDURE — 87045 FECES CULTURE AEROBIC BACT: CPT

## 2018-03-12 PROCEDURE — 85652 RBC SED RATE AUTOMATED: CPT

## 2018-03-12 PROCEDURE — 80053 COMPREHEN METABOLIC PANEL: CPT

## 2018-03-12 PROCEDURE — 84300 ASSAY OF URINE SODIUM: CPT

## 2018-03-12 PROCEDURE — 80061 LIPID PANEL: CPT

## 2018-03-12 PROCEDURE — 36415 COLL VENOUS BLD VENIPUNCTURE: CPT

## 2018-03-12 PROCEDURE — 84100 ASSAY OF PHOSPHORUS: CPT

## 2018-03-12 PROCEDURE — 87177 OVA AND PARASITES SMEARS: CPT

## 2018-03-12 RX ORDER — TOPIRAMATE 25 MG
1 TABLET ORAL
Qty: 0 | Refills: 0 | COMMUNITY

## 2018-03-12 RX ORDER — AMLODIPINE BESYLATE 2.5 MG/1
1 TABLET ORAL
Qty: 0 | Refills: 0 | COMMUNITY

## 2018-03-12 RX ORDER — MAGNESIUM SULFATE 500 MG/ML
1 VIAL (ML) INJECTION ONCE
Qty: 0 | Refills: 0 | Status: COMPLETED | OUTPATIENT
Start: 2018-03-12 | End: 2018-03-12

## 2018-03-12 RX ADMIN — SODIUM CHLORIDE 100 MILLILITER(S): 9 INJECTION, SOLUTION INTRAVENOUS at 02:12

## 2018-03-12 RX ADMIN — Medication 650 MILLIGRAM(S): at 10:42

## 2018-03-12 RX ADMIN — Medication 650 MILLIGRAM(S): at 10:12

## 2018-03-12 RX ADMIN — Medication 100 GRAM(S): at 07:49

## 2018-03-12 NOTE — PROGRESS NOTE ADULT - PROBLEM SELECTOR PLAN 2
Patient with initial metabolic alkalosis now improved with bicarbonate 21 at present likely due to volume contraction/dehydration and diuretic therapy.    Plan:  Monitor BMP in 3 to 4 days upon discharge  Avoid diuretic therapy for now.  Avoid excessive alkali use.

## 2018-03-12 NOTE — PROGRESS NOTE ADULT - PROBLEM SELECTOR PLAN 1
repleted; likely d/t diarrhea (which has resolved) in setting of HCTZ use (which has been d/c'ed); appreciate Renal recs, aldosterone level low, renin level pending, 24-hour urine collected

## 2018-03-12 NOTE — PROGRESS NOTE ADULT - SUBJECTIVE AND OBJECTIVE BOX
Patient is a 47y old  Female who presents with a chief complaint of Diarrhea (09 Mar 2018 15:04)      INTERVAL HPI/OVERNIGHT EVENTS:    Pt. seen and examined at 12:30PM  Pt. feels well; diarrhea resolved  Tolerating P.O.  chronic migraine HA controlled w/ rx    Review of Systems: 12 point review of systems otherwise negative    MEDICATIONS  (STANDING):  celecoxib 200 milliGRAM(s) Oral daily  heparin  Injectable 5000 Unit(s) SubCutaneous every 8 hours    MEDICATIONS  (PRN):  acetaminophen   Tablet. 650 milliGRAM(s) Oral every 6 hours PRN Moderate Pain (4 - 6)  acetaminophen   Tablet. 325 milliGRAM(s) Oral every 6 hours PRN Mild Pain (1 - 3)  loperamide 2 milliGRAM(s) Oral every 4 hours PRN Diarrhea      Allergies    No Known Allergies    Intolerances          Vital Signs Last 24 Hrs  T(C): 36.8 (12 Mar 2018 15:31), Max: 36.8 (12 Mar 2018 15:31)  T(F): 98.2 (12 Mar 2018 15:31), Max: 98.2 (12 Mar 2018 15:31)  HR: 82 (12 Mar 2018 15:31) (71 - 82)  BP: 127/76 (12 Mar 2018 15:31) (112/75 - 127/76)  BP(mean): 93 (12 Mar 2018 15:31) (93 - 93)  RR: 16 (12 Mar 2018 15:31) (16 - 18)  SpO2: 98% (12 Mar 2018 15:31) (98% - 99%)  CAPILLARY BLOOD GLUCOSE      POCT Blood Glucose.: 101 mg/dL (11 Mar 2018 21:55)  POCT Blood Glucose.: 146 mg/dL (11 Mar 2018 17:58)      03-11 @ 07:01  -  03-12 @ 07:00  --------------------------------------------------------  IN: 1200 mL / OUT: 0 mL / NET: 1200 mL        Physical Exam:  (at 12:30PM)  Daily     Daily   General:  well-appearing in NAD  HEENT:  MMM  Abdomen:  soft NT ND obese  Extremities:  no edema B/L LE  Skin:  WWP  Neuro:  AAOx3    LABS:                        9.1    4.7   )-----------( 232      ( 12 Mar 2018 06:44 )             29.3     03-12    139  |  109<H>  |  10  ----------------------------<  93  4.1   |  21<L>  |  0.60    Ca    8.0<L>      12 Mar 2018 06:44  Phos  2.3     03-11  Mg     1.8     03-12              RADIOLOGY & ADDITIONAL TESTS:    ---------------------------------------------------------------------------  I personally reviewed: [  ]EKG   [  ]CXR    [  ] CT    [  ]Other  ---------------------------------------------------------------------------  PLEASE CHECK WHEN PRESENT:     [  ]Heart Failure     [  ] Acute     [  ] Acute on Chronic     [  ] Chronic  -------------------------------------------------------------------     [  ]Diastolic [HFpEF]     [  ]Systolic [HFrEF]     [  ]Combined [HFpEF & HFrEF]     [  ]Other:  -------------------------------------------------------------------  [  ]MILAGROS     [  ]ATN     [  ]Reneal Medullary Necrosis     [  ]Renal Cortical Necrosis     [  ]Other Pathological Lesions:    [  ]CKD 1  [  ]CKD 2  [  ]CKD 3  [  ]CKD 4  [  ]CKD 5  [  ]Other  -------------------------------------------------------------------  [  ]Other/Unspecified:    --------------------------------------------------------------------    Abdominal Nutritional Status  [  ]Malnutrition: See Nutrition Note  [  ]Cachexia  [  ]Other:   [  ]Supplement Ordered:  [  ]Morbid Obesity (BMI >=40]

## 2018-03-12 NOTE — PROGRESS NOTE ADULT - PROBLEM SELECTOR PLAN 3
Diarrhea likely due to underlying possible IBS/Inflammatory bowel disease vs small intestinal tumors  Plan:  Consider out patient GI evaluation if persist or recurrent diarrhea.  Stool studies.  Obtain

## 2018-03-12 NOTE — PROGRESS NOTE ADULT - ASSESSMENT
**Pt seen at bedside.  No complaints.  Case reviewed and discussed with renal fellow, Dr. Espinoza at length.  Improvement in potassium to 3.2; Continue to replete. Met Alk resolved.   We are awaiting the results of Serum Aldosterone and Renin.  No further diuretics at this time.  BP's acceptable.  Please begin a 24hour urine collection to quantify urinary K losses.  Consider Endocrine consult.
**Pt seen at bedside.  No complaints.  Case reviewed and discussed with renal fellow, Dr. Espinoza at length.  Improvement in potassium to; Continue to replete. Met Alk resolved.   We are awaiting the results of Serum Aldosterone and Renin.  No further diuretics at this time.  BP's acceptable.  Please begin a 24hour urine collection to quantify urinary K losses.  Continue to trend phosphorous levet and replete at needed.   Please consult GI regarding diarrhea and significant GI losses.  Consider Endocrine consult.
46 y/o F w/
46 y/o F w/
47 year old female w/ pmhx of childhood asthma, herniated disk, HTN, migraine (on topiramate), benign vocal chord nodules removed 2003, cholecystectomy 1995 p/w multiple, non-specific symptoms, which include abdominal pain and diarrhea x 1 week.
48 y/o F w/
7 year old female w/ pmhx of childhood asthma, herniate disk, HTN, migraine (on topiramate), benign vocal chord nodules removed 2003, cholecystectomy 1995 p/w multiple, non-specific symptoms, which include abdominal pain and diarrhea x 1 week. Nephrology consult called for hypokalemia.

## 2018-03-12 NOTE — PROGRESS NOTE ADULT - SUBJECTIVE AND OBJECTIVE BOX
Patient is a 47y Female seen and evaluated at bedside. Patient lying in bed in no acute distress at present. Denies any chest pain, shortness of breath, palpitations, dizziness at present. Denies any abdominal pain, nausea, vomiting, diarrhea at present. K level improved to 4.1 at present.       acetaminophen   Tablet. 650 every 6 hours PRN  acetaminophen   Tablet. 325 every 6 hours PRN  celecoxib 200 daily  heparin  Injectable 5000 every 8 hours  loperamide 2 every 4 hours PRN      Allergies    No Known Allergies    Intolerances        T(C): , Max: 36.8 (03-11-18 @ 15:37)  T(F): , Max: 98.3 (03-11-18 @ 15:37)  HR: 76 (03-12-18 @ 08:09)  BP: 112/75 (03-12-18 @ 08:09)  BP(mean): 74 (03-11-18 @ 15:37)  RR: 18 (03-12-18 @ 08:09)  SpO2: 99% (03-12-18 @ 08:09)  Wt(kg): --    03-11 @ 07:01  -  03-12 @ 07:00  --------------------------------------------------------  IN: 1200 mL / OUT: 0 mL / NET: 1200 mL          Review of Systems:  CONSTITUTIONAL: No fever or chills, No fatigue or tiredness.  EYES: No blurred or double vision.  RESPIRATORY: No shortness of breath, cough, hemoptysis  CARDIOVASCULAR: No Chest pain or shortness of breath  GASTROINTESTINAL: No abdominal or flank pain, No nausea or vomiting, No diarrhea  GENITOURINARY: No dysuria or urinary burning, No difficulty passing urine, No hematuria  NEUROLOGICAL: No headaches or blurred vision  SKIN: No skin rashes   MUSCULOSKELETAL: No arthralgia, Joint pain, leg edema, No muscle pains      PHYSICAL EXAM:  GENERAL: NAD, well-developed, well nourished, alert, awake, no acute distress at present  HEAD:  Atraumatic, Normocephalic,   EYES: Bilateral conjuctiva and sclera normal   Oral cavity: Oral mucosa dry and pale  NECK: Neck supple, No JVD  CHEST/LUNG: Clear to auscultation bilaterally; No wheeze, no rales, no crepitations  HEART: Regular rate and rhythm. SHAWNA II/VI at LPSB, No gallop, no rub   ABDOMEN: Soft, Nontender, non distended, BS+nt, No flank tenderness.   EXTREMITIES: No clubbing, cyanosis, or edema  Neurology: AAOx3, no focal neurological deficit  SKIN: No rashes or lesions          ACCESS:     LABS:                        9.1    4.7   )-----------( 232      ( 12 Mar 2018 06:44 )             29.3     03-12    139  |  109<H>  |  10  ----------------------------<  93  4.1   |  21<L>  |  0.60    Ca    8.0<L>      12 Mar 2018 06:44  Phos  2.3     03-11  Mg     1.8     03-12                  RADIOLOGY & ADDITIONAL STUDIES:  < from: US Retroperitoneal Complete (03.11.18 @ 13:04) >    EXAM:  US RETROPERITONEAL COMPLETE                          PROCEDURE DATE:  03/11/2018                     INTERPRETATION:  RENAL and BLADDER ULTRASOUND dated 3/11/2018 1:04 PM    Indication: Hypokalemia.    Prior studies: None.    Findings:    RIGHT KIDNEY:  Length: 11.3 cm  Lesions: None  Hydronephrosis: None  Stones: None  Echogenicity: Normal    LEFT KIDNEY:  Length: 12.8 cm  Lesions: None  Hydronephrosis: None  Stones: None  Echogenicity: Normal    URINARY BLADDER:  Ureteral jets: Both visible.  Filling defects: None  Bladder volume: Pre-void: 54 ml. Patient was unable to void.    Incidentally noted is increased echogenicity of the liver parenchyma   consistent with hepatic steatosis.      IMPRESSION:  No hydronephrosis. Normal ultrasound appearance of the kidneys.    Hepatic steatosis.            "Thank you for the opportunity to participate in the care of this   patient."    GIOVANI GERMAIN M.D., RADIOLOGY RESIDENT  This document has been electronically signed.  JOSHUA APPIAH M.D., ATTENDING RADIOLOGIST  This document has been electronically signed. Mar 11 2018  2:31PM                  < end of copied text >

## 2018-03-12 NOTE — PROGRESS NOTE ADULT - ATTENDING COMMENTS
Dispo: d/c home today w/ outpatient f/u
Dispo: pending 24-hour urine collection, Renal clearance  will need close outpatient f/u
Dispo: pending improved K, further work-up
Pt. seen and examined by me earlier today; I have read Dr. Damon's note, I agree w/ his findings and plan of care as documented; diarrhea resolved, K improving, cont. repletion, holding HCTZ, appreciate Renal recs, renin and aldosterone levels pending, will check 24-hr urine K

## 2018-03-12 NOTE — PROGRESS NOTE ADULT - PROBLEM SELECTOR PLAN 1
Hypokalemia now corrected with K level 4.1 at present likely due to diarrhea/Gi losses as well as diuretic therapy use.    Plan:  PRA and aldosterone level pending at present.  Monitor or repeat BMP in 3 to 4 days upon discharge  Avoid laxative use  Discontinue diuretic therapy  Monitor BMP in 3 to 4 days upon discharge  Consider out patient endocrine evaluation if abnormal PRA and Aldosterone level.

## 2018-03-12 NOTE — PROGRESS NOTE ADULT - PROBLEM SELECTOR PLAN 4
Anemia of chronic illness with slowly downtrending hemoglobin to 9.5 likely dilutional in nature  Repeat CBC as out patient  Obtain iron studies  Consider GI/hematology evaluation as out patient.

## 2018-03-14 DIAGNOSIS — E66.9 OBESITY, UNSPECIFIED: ICD-10-CM

## 2018-03-14 DIAGNOSIS — E87.6 HYPOKALEMIA: ICD-10-CM

## 2018-03-14 DIAGNOSIS — G89.29 OTHER CHRONIC PAIN: ICD-10-CM

## 2018-03-14 DIAGNOSIS — M51.26 OTHER INTERVERTEBRAL DISC DISPLACEMENT, LUMBAR REGION: ICD-10-CM

## 2018-03-14 DIAGNOSIS — R19.7 DIARRHEA, UNSPECIFIED: ICD-10-CM

## 2018-03-14 DIAGNOSIS — I10 ESSENTIAL (PRIMARY) HYPERTENSION: ICD-10-CM

## 2018-03-14 DIAGNOSIS — E83.39 OTHER DISORDERS OF PHOSPHORUS METABOLISM: ICD-10-CM

## 2018-03-14 DIAGNOSIS — G43.911 MIGRAINE, UNSPECIFIED, INTRACTABLE, WITH STATUS MIGRAINOSUS: ICD-10-CM

## 2018-03-14 DIAGNOSIS — D64.9 ANEMIA, UNSPECIFIED: ICD-10-CM

## 2018-03-14 DIAGNOSIS — E87.3 ALKALOSIS: ICD-10-CM

## 2018-03-14 LAB
CULTURE RESULTS: SIGNIFICANT CHANGE UP
SPECIMEN SOURCE: SIGNIFICANT CHANGE UP

## 2018-03-15 LAB — RENIN PLAS-CCNC: 5.57 NG/ML/HR — HIGH (ref 0.17–5.38)

## 2018-12-08 NOTE — ED ADULT NURSE NOTE - OBJECTIVE STATEMENT
Telephone Encounter by Makenzie Ledesma at 04/11/17 10:03 AM     Author:  Makenzie Ledesma Service:  (none) Author Type:  Patient      Filed:  04/11/17 10:07 AM Encounter Date:  4/10/2017 Status:  Signed     :  Makenzie Ledesma (Patient )            Spoke with patient, scheduled surgery for 10:30AM on 5/8/17.[KR1.1M] Verbal instructions given to patient and[KR1.1T] sent via [employer]. Pt had no questions.[KR1.1M]       Revision History        User Key Date/Time User Provider Type Action    > KR1.1 04/11/17 10:07 AM Makenzie Ledesma Patient  Sign    M - Manual, T - Template             The patient is a 48 y/o female who came in to ED for evaluation of diarrhea, nausea, vomiting, abdominal soreness, weakness and loss of appetite for the past 3 weeks. Pt reports that her dose of topiramate was increased to 50 mg and she believed "it was a side effects" however pt has been taking this medication since october last year.

## 2019-02-05 ENCOUNTER — EMERGENCY (EMERGENCY)
Facility: HOSPITAL | Age: 49
LOS: 1 days | Discharge: HOME | End: 2019-02-05
Attending: EMERGENCY MEDICINE | Admitting: EMERGENCY MEDICINE
Payer: MEDICARE

## 2019-02-05 VITALS
RESPIRATION RATE: 20 BRPM | TEMPERATURE: 99 F | DIASTOLIC BLOOD PRESSURE: 75 MMHG | SYSTOLIC BLOOD PRESSURE: 149 MMHG | OXYGEN SATURATION: 96 % | HEART RATE: 84 BPM

## 2019-02-05 VITALS
RESPIRATION RATE: 18 BRPM | DIASTOLIC BLOOD PRESSURE: 91 MMHG | HEIGHT: 64 IN | TEMPERATURE: 98 F | SYSTOLIC BLOOD PRESSURE: 153 MMHG | HEART RATE: 90 BPM | OXYGEN SATURATION: 99 % | WEIGHT: 214.95 LBS

## 2019-02-05 DIAGNOSIS — R06.02 SHORTNESS OF BREATH: ICD-10-CM

## 2019-02-05 DIAGNOSIS — Z79.899 OTHER LONG TERM (CURRENT) DRUG THERAPY: ICD-10-CM

## 2019-02-05 DIAGNOSIS — I10 ESSENTIAL (PRIMARY) HYPERTENSION: ICD-10-CM

## 2019-02-05 DIAGNOSIS — Z79.52 LONG TERM (CURRENT) USE OF SYSTEMIC STEROIDS: ICD-10-CM

## 2019-02-05 DIAGNOSIS — R07.89 OTHER CHEST PAIN: ICD-10-CM

## 2019-02-05 DIAGNOSIS — R05 COUGH: ICD-10-CM

## 2019-02-05 DIAGNOSIS — Z98.890 OTHER SPECIFIED POSTPROCEDURAL STATES: Chronic | ICD-10-CM

## 2019-02-05 DIAGNOSIS — J45.909 UNSPECIFIED ASTHMA, UNCOMPLICATED: ICD-10-CM

## 2019-02-05 PROCEDURE — 71046 X-RAY EXAM CHEST 2 VIEWS: CPT | Mod: 26

## 2019-02-05 PROCEDURE — 82550 ASSAY OF CK (CPK): CPT

## 2019-02-05 PROCEDURE — 99285 EMERGENCY DEPT VISIT HI MDM: CPT | Mod: 25

## 2019-02-05 PROCEDURE — 85730 THROMBOPLASTIN TIME PARTIAL: CPT

## 2019-02-05 PROCEDURE — 36415 COLL VENOUS BLD VENIPUNCTURE: CPT

## 2019-02-05 PROCEDURE — 85025 COMPLETE CBC W/AUTO DIFF WBC: CPT

## 2019-02-05 PROCEDURE — 71046 X-RAY EXAM CHEST 2 VIEWS: CPT

## 2019-02-05 PROCEDURE — 94640 AIRWAY INHALATION TREATMENT: CPT

## 2019-02-05 PROCEDURE — 85610 PROTHROMBIN TIME: CPT

## 2019-02-05 PROCEDURE — 99284 EMERGENCY DEPT VISIT MOD MDM: CPT | Mod: 25

## 2019-02-05 PROCEDURE — 80053 COMPREHEN METABOLIC PANEL: CPT

## 2019-02-05 PROCEDURE — 84484 ASSAY OF TROPONIN QUANT: CPT

## 2019-02-05 RX ORDER — HYDROCODONE BITARTRATE AND HOMATROPINE METHYLBROMIDE 5; 1.5 MG/5ML; MG/5ML
5 SOLUTION ORAL
Qty: 100 | Refills: 0
Start: 2019-02-05

## 2019-02-05 RX ORDER — ALBUTEROL 90 UG/1
2 AEROSOL, METERED ORAL
Qty: 8 | Refills: 0
Start: 2019-02-05 | End: 2019-02-09

## 2019-02-05 RX ORDER — IPRATROPIUM/ALBUTEROL SULFATE 18-103MCG
3 AEROSOL WITH ADAPTER (GRAM) INHALATION ONCE
Qty: 0 | Refills: 0 | Status: COMPLETED | OUTPATIENT
Start: 2019-02-05 | End: 2019-02-05

## 2019-02-05 RX ADMIN — Medication 60 MILLIGRAM(S): at 21:30

## 2019-02-05 RX ADMIN — Medication 3 MILLILITER(S): at 19:38

## 2019-02-05 NOTE — ED ADULT NURSE NOTE - PLAN OF CARE
no strength deficits were identified/LLE not formally assessed 2/2 WB status
Call bell/Explanation of exam/test

## 2019-02-05 NOTE — ED ADULT NURSE REASSESSMENT NOTE - NS ED NURSE REASSESS COMMENT FT1
Patient a/oX 3, c/o of intermittent chest and back pain w/ SOB on ambulation and exertion.  Vital signs stable.  No difficulty speaking in long sentences.  MD evaluation pending. STable and comfortable.

## 2019-02-05 NOTE — ED ADULT NURSE NOTE - PMH
Chronic back pain    Essential hypertension    Herniated lumbar intervertebral disc    Migraine with status migrainosus, not intractable, unspecified migraine type

## 2019-02-05 NOTE — ED ADULT NURSE NOTE - GASTROINTESTINAL ASSESSMENT
wear glasses/Normal vision: sees adequately in most situations; can see medication labels, newsprint
WDL

## 2019-02-05 NOTE — ED PROVIDER NOTE - MEDICAL DECISION MAKING DETAILS
49 y/o f hx asthma, HTN presents with 3 weeks of chest tightness, sob, with coughing after URI; pt afebrile, not wheezing on exam but with several episodes of severe coughing in ED and suspect bronchospasm.  Will start on prednisone, albuterol, f/u pmd.

## 2019-02-05 NOTE — ED PROVIDER NOTE - OBJECTIVE STATEMENT
47 y/o f hx asthma, HTN presents with persistent cough, chest tightness after URI 3 weeks ago.  Pt stating fever resolved, has fits of coughing which causes her to be short of breath.  Denies chest pain, n/v/d, sore throat, all other ROS negative.

## 2019-02-05 NOTE — ED ADULT NURSE NOTE - OBJECTIVE STATEMENT
Patient w/ Hx. of HTN and migraine headaches, c/o of 1 week increasing SOB w/ exertion, intermittent chest pains.  No difficulty speaking  in long sentences. EKG NSR in ED.

## 2019-02-05 NOTE — ED PROVIDER NOTE - ATTENDING CONTRIBUTION TO CARE
I have seen the pt, reviewed all pertinent clinical data, and I agree with the documentation/care/plan executed by JAGJIT Shaffer.

## 2019-02-06 PROBLEM — M51.26 OTHER INTERVERTEBRAL DISC DISPLACEMENT, LUMBAR REGION: Chronic | Status: ACTIVE | Noted: 2018-03-09

## 2019-02-06 PROBLEM — G43.901 MIGRAINE, UNSPECIFIED, NOT INTRACTABLE, WITH STATUS MIGRAINOSUS: Chronic | Status: ACTIVE | Noted: 2018-03-09

## 2019-02-06 PROBLEM — I10 ESSENTIAL (PRIMARY) HYPERTENSION: Chronic | Status: ACTIVE | Noted: 2018-03-09

## 2019-02-06 PROBLEM — M54.9 DORSALGIA, UNSPECIFIED: Chronic | Status: ACTIVE | Noted: 2017-07-08

## 2019-04-08 PROBLEM — Z00.00 ENCOUNTER FOR PREVENTIVE HEALTH EXAMINATION: Status: ACTIVE | Noted: 2019-04-08

## 2019-05-14 ENCOUNTER — APPOINTMENT (OUTPATIENT)
Dept: SURGERY | Facility: CLINIC | Age: 49
End: 2019-05-14
Payer: MEDICARE

## 2019-05-14 VITALS
HEIGHT: 65.25 IN | DIASTOLIC BLOOD PRESSURE: 81 MMHG | WEIGHT: 241.5 LBS | HEART RATE: 91 BPM | OXYGEN SATURATION: 96 % | TEMPERATURE: 97.1 F | SYSTOLIC BLOOD PRESSURE: 127 MMHG | BODY MASS INDEX: 39.75 KG/M2

## 2019-05-14 DIAGNOSIS — I10 ESSENTIAL (PRIMARY) HYPERTENSION: ICD-10-CM

## 2019-05-14 DIAGNOSIS — E66.01 MORBID (SEVERE) OBESITY DUE TO EXCESS CALORIES: ICD-10-CM

## 2019-05-14 PROCEDURE — 99204 OFFICE O/P NEW MOD 45 MIN: CPT

## 2019-05-14 NOTE — ASSESSMENT
[FreeTextEntry1] : 49 yo F with interest in bariatric surgery. Pt is specifically interested in gastric bypass. She will begin work up for this.

## 2019-05-14 NOTE — END OF VISIT
[FreeTextEntry3] : All medical record entries made by the Scribe were at my, Dr. Yeh's direction and personally dictated by me on 05/14/2019  I have reviewed the chart and agree that the record accurately reflects my personal performance of the history, physical exam, assessment and plan. I have also personally directed, reviewed, and agreed with the chart.\par

## 2019-05-14 NOTE — HISTORY OF PRESENT ILLNESS
[de-identified] : 47 yo F here for initial consultation for bariatric surgery. She has attended the seminar online. She states that she has had problems with her weight all her life. She reports that she has attempted many different diets and exercises to no avail. She would lose some weight in the process but gain it all back after. She has a hx of 3 herniated spinal disc., migraines, lower extremity edema, vocal nodules and a cholecystectomy. Denies smoking or drinking. NKDA. She is currently on water pills. Pt is  with one 23 yr old daughter. She is interested in gastric bypass.

## 2019-05-14 NOTE — ADDENDUM
[FreeTextEntry1] : Documented by Chelsea Huynh acting as a scribe for Dr. Bryan Yeh on 05/14/2019\par

## 2019-05-15 ENCOUNTER — OTHER (OUTPATIENT)
Age: 49
End: 2019-05-15

## 2019-05-21 ENCOUNTER — APPOINTMENT (OUTPATIENT)
Dept: BARIATRICS | Facility: CLINIC | Age: 49
End: 2019-05-21

## 2019-05-22 ENCOUNTER — APPOINTMENT (OUTPATIENT)
Dept: SLEEP CENTER | Facility: HOSPITAL | Age: 49
End: 2019-05-22

## 2019-05-22 ENCOUNTER — OUTPATIENT (OUTPATIENT)
Dept: OUTPATIENT SERVICES | Facility: HOSPITAL | Age: 49
LOS: 1 days | End: 2019-05-22
Payer: MEDICARE

## 2019-05-22 DIAGNOSIS — G47.33 OBSTRUCTIVE SLEEP APNEA (ADULT) (PEDIATRIC): ICD-10-CM

## 2019-05-22 DIAGNOSIS — Z98.890 OTHER SPECIFIED POSTPROCEDURAL STATES: Chronic | ICD-10-CM

## 2019-05-22 PROCEDURE — 95810 POLYSOM 6/> YRS 4/> PARAM: CPT

## 2019-05-22 PROCEDURE — 95810 POLYSOM 6/> YRS 4/> PARAM: CPT | Mod: 26

## 2019-06-11 ENCOUNTER — APPOINTMENT (OUTPATIENT)
Dept: BARIATRICS | Facility: CLINIC | Age: 49
End: 2019-06-11

## 2019-07-08 ENCOUNTER — APPOINTMENT (OUTPATIENT)
Dept: BARIATRICS | Facility: CLINIC | Age: 49
End: 2019-07-08

## 2020-11-17 ENCOUNTER — EMERGENCY (EMERGENCY)
Facility: HOSPITAL | Age: 50
LOS: 1 days | Discharge: HOME | End: 2020-11-17
Attending: EMERGENCY MEDICINE | Admitting: EMERGENCY MEDICINE
Payer: MEDICARE

## 2020-11-17 VITALS
SYSTOLIC BLOOD PRESSURE: 148 MMHG | OXYGEN SATURATION: 97 % | RESPIRATION RATE: 18 BRPM | WEIGHT: 214.95 LBS | TEMPERATURE: 98 F | HEIGHT: 64 IN | HEART RATE: 95 BPM | DIASTOLIC BLOOD PRESSURE: 87 MMHG

## 2020-11-17 DIAGNOSIS — Z98.890 OTHER SPECIFIED POSTPROCEDURAL STATES: Chronic | ICD-10-CM

## 2020-11-17 PROCEDURE — 99283 EMERGENCY DEPT VISIT LOW MDM: CPT

## 2020-11-17 PROCEDURE — 73630 X-RAY EXAM OF FOOT: CPT | Mod: 26,LT

## 2020-11-17 PROCEDURE — 99284 EMERGENCY DEPT VISIT MOD MDM: CPT

## 2020-11-17 PROCEDURE — 73630 X-RAY EXAM OF FOOT: CPT

## 2020-11-17 NOTE — ED PROVIDER NOTE - NSFOLLOWUPCLINICS_GEN_ALL_ED_FT
[facility] - Podiatry Clinic  Podiatry  [address] [place] [address]  Phone: [phone]  Fax:   Follow Up Time: 4-6 Days

## 2020-11-17 NOTE — ED PROVIDER NOTE - NSFOLLOWUPINSTRUCTIONS_ED_ALL_ED_FT
Fracture    A fracture is a break in one of your bones. This can occur from a variety of injuries, especially traumatic ones. Symptoms include pain, bruising, or swelling. Do not use the injured limb. If a fracture is in one of the bones below your waist, do not put weight on that limb unless instructed to do so by your healthcare provider. Crutches or a cane may have been provided. A splint or cast may have been applied by your health care provider. Make sure to keep it dry and follow up with an orthopedist as instructed.    SEEK IMMEDIATE MEDICAL CARE IF YOU HAVE ANY OF THE FOLLOWING SYMPTOMS: numbness, tingling, increasing pain, or weakness in any part of the injured limb.     Use crutches, ace wrap and hard shoe until you follow up with podiatry.

## 2020-11-17 NOTE — ED PROVIDER NOTE - PATIENT PORTAL LINK FT
You can access the FollowMyHealth Patient Portal offered by [facility] by registering at the following website: [email or web address]. By joining [facility]’s FollowMyHealth portal, you will also be able to view your health information using other applications (apps) compatible with our system.

## 2020-11-17 NOTE — ED PROVIDER NOTE - CARE PROVIDER_API CALL
[facility]  PODIATRIC MEDICINE AND SURGERY  [address], [address]  [place], [place] [address]  Phone: [phone]  Fax: [phone]  Follow Up Time: 4-6 Days

## 2020-11-17 NOTE — ED PROVIDER NOTE - MUSCULOSKELETAL, MLM
Soft tissue swelling to the lateral aspect of L mid foot; normal color, temperature, and cap refill; tenderness over base of the L 5th metatarsal; no ankle tenderness or swelling; no proximal fibular tenderness. DP/PT pulses of L foot 2/2.

## 2020-11-17 NOTE — ED PROVIDER NOTE - CLINICAL SUMMARY MEDICAL DECISION MAKING FREE TEXT BOX
49 y/o F presents to the ED w/ inversion injury sustained while walking at home around 2pm. Pt found to have swelling over the lateral aspect of the 5th metatarsal w/ associated tenderness, concerning for fracture, Hanson vs. avulsion vs. sprain. No neurovascular compromise. Numbness to small toe likely 2/2 neuropraxia and swelling. Good pulses present. Plan for XR, ice pack, and reassess need for hard sole shoe and surgical intervention.

## 2020-11-17 NOTE — ED PROVIDER NOTE - CARE PLAN
Principal Discharge DX:	Closed avulsion fracture of metatarsal bone of left foot, initial encounter

## 2020-11-17 NOTE — ED ADULT NURSE NOTE - OBJECTIVE STATEMENT
Pt presents to ED c/o L ankle pain/injury. Pt tripped while walking and rolled L ankle. Reports pain to L ankle, and "pinky toe numbness". Swelling noted to lateral aspect of L ankle. pulses intact.

## 2020-11-17 NOTE — ED PROVIDER NOTE - OBJECTIVE STATEMENT
51 y/o F with PMHx HTN, migraines, herniated lumbar intervertebral disc, and chronic back pain, presents to the ED c/o L ankle and foot pain since 2pm today. Pt states she was walking in her living room when she suddenly rolled her ankle. Denies fall, hitting her head, or LOC. Currently reports severe pain in the L ankle and foot, difficulty bearing weight on the foot due to the pain, and numbness to the L 5th toe.

## 2020-11-17 NOTE — ED ADULT NURSE REASSESSMENT NOTE - NS ED NURSE REASSESS COMMENT FT1
L ankle wrapped with ACE bandage. Surgical shoe on L foot. Pt provided with crutches. Educated on crutches use. Return demonstration completed. Pt ambulatory w steady gait w crutches.
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
## 2020-11-21 DIAGNOSIS — M25.572 PAIN IN LEFT ANKLE AND JOINTS OF LEFT FOOT: ICD-10-CM

## 2020-11-21 DIAGNOSIS — S92.352A DISPLACED FRACTURE OF FIFTH METATARSAL BONE, LEFT FOOT, INITIAL ENCOUNTER FOR CLOSED FRACTURE: ICD-10-CM

## 2020-11-21 DIAGNOSIS — I10 ESSENTIAL (PRIMARY) HYPERTENSION: ICD-10-CM
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
## 2020-11-21 DIAGNOSIS — Z79.899 OTHER LONG TERM (CURRENT) DRUG THERAPY: ICD-10-CM
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
## 2021-06-13 ENCOUNTER — EMERGENCY (EMERGENCY)
Facility: HOSPITAL | Age: 51
LOS: 1 days | Discharge: HOME | End: 2021-06-13
Attending: EMERGENCY MEDICINE | Admitting: EMERGENCY MEDICINE
Payer: MEDICARE

## 2021-06-13 VITALS
TEMPERATURE: 98 F | SYSTOLIC BLOOD PRESSURE: 101 MMHG | HEART RATE: 72 BPM | DIASTOLIC BLOOD PRESSURE: 62 MMHG | RESPIRATION RATE: 16 BRPM | OXYGEN SATURATION: 98 %

## 2021-06-13 VITALS
OXYGEN SATURATION: 94 % | HEIGHT: 64 IN | RESPIRATION RATE: 16 BRPM | DIASTOLIC BLOOD PRESSURE: 73 MMHG | WEIGHT: 229.94 LBS | TEMPERATURE: 98 F | SYSTOLIC BLOOD PRESSURE: 103 MMHG | HEART RATE: 83 BPM

## 2021-06-13 DIAGNOSIS — Z98.890 OTHER SPECIFIED POSTPROCEDURAL STATES: Chronic | ICD-10-CM

## 2021-06-13 DIAGNOSIS — R10.9 UNSPECIFIED ABDOMINAL PAIN: ICD-10-CM

## 2021-06-13 DIAGNOSIS — M54.9 DORSALGIA, UNSPECIFIED: ICD-10-CM

## 2021-06-13 DIAGNOSIS — M51.26 OTHER INTERVERTEBRAL DISC DISPLACEMENT, LUMBAR REGION: ICD-10-CM

## 2021-06-13 DIAGNOSIS — I10 ESSENTIAL (PRIMARY) HYPERTENSION: ICD-10-CM

## 2021-06-13 DIAGNOSIS — G43.909 MIGRAINE, UNSPECIFIED, NOT INTRACTABLE, WITHOUT STATUS MIGRAINOSUS: ICD-10-CM

## 2021-06-13 DIAGNOSIS — R11.0 NAUSEA: ICD-10-CM

## 2021-06-13 LAB
ALBUMIN SERPL ELPH-MCNC: 4.2 G/DL — SIGNIFICANT CHANGE UP (ref 3.3–5)
ALP SERPL-CCNC: 113 U/L — SIGNIFICANT CHANGE UP (ref 40–120)
ALT FLD-CCNC: 30 U/L — SIGNIFICANT CHANGE UP (ref 10–45)
ANION GAP SERPL CALC-SCNC: 12 MMOL/L — SIGNIFICANT CHANGE UP (ref 5–17)
APPEARANCE UR: CLEAR — SIGNIFICANT CHANGE UP
AST SERPL-CCNC: 22 U/L — SIGNIFICANT CHANGE UP (ref 10–40)
BACTERIA # UR AUTO: PRESENT /HPF
BASOPHILS # BLD AUTO: 0.06 K/UL — SIGNIFICANT CHANGE UP (ref 0–0.2)
BASOPHILS NFR BLD AUTO: 1 % — SIGNIFICANT CHANGE UP (ref 0–2)
BILIRUB SERPL-MCNC: 0.2 MG/DL — SIGNIFICANT CHANGE UP (ref 0.2–1.2)
BILIRUB UR-MCNC: NEGATIVE — SIGNIFICANT CHANGE UP
BUN SERPL-MCNC: 15 MG/DL — SIGNIFICANT CHANGE UP (ref 7–23)
CALCIUM SERPL-MCNC: 9.5 MG/DL — SIGNIFICANT CHANGE UP (ref 8.4–10.5)
CHLORIDE SERPL-SCNC: 103 MMOL/L — SIGNIFICANT CHANGE UP (ref 96–108)
CO2 SERPL-SCNC: 25 MMOL/L — SIGNIFICANT CHANGE UP (ref 22–31)
COLOR SPEC: YELLOW — SIGNIFICANT CHANGE UP
CREAT SERPL-MCNC: 0.88 MG/DL — SIGNIFICANT CHANGE UP (ref 0.5–1.3)
DIFF PNL FLD: ABNORMAL
EOSINOPHIL # BLD AUTO: 0.31 K/UL — SIGNIFICANT CHANGE UP (ref 0–0.5)
EOSINOPHIL NFR BLD AUTO: 5.2 % — SIGNIFICANT CHANGE UP (ref 0–6)
EPI CELLS # UR: SIGNIFICANT CHANGE UP /HPF (ref 0–5)
GLUCOSE SERPL-MCNC: 84 MG/DL — SIGNIFICANT CHANGE UP (ref 70–99)
GLUCOSE UR QL: NEGATIVE — SIGNIFICANT CHANGE UP
HCG SERPL-ACNC: 7 MIU/ML — SIGNIFICANT CHANGE UP
HCT VFR BLD CALC: 37.7 % — SIGNIFICANT CHANGE UP (ref 34.5–45)
HGB BLD-MCNC: 11.7 G/DL — SIGNIFICANT CHANGE UP (ref 11.5–15.5)
IMM GRANULOCYTES NFR BLD AUTO: 0.3 % — SIGNIFICANT CHANGE UP (ref 0–1.5)
KETONES UR-MCNC: NEGATIVE — SIGNIFICANT CHANGE UP
LEUKOCYTE ESTERASE UR-ACNC: NEGATIVE — SIGNIFICANT CHANGE UP
LIDOCAIN IGE QN: 28 U/L — SIGNIFICANT CHANGE UP (ref 7–60)
LYMPHOCYTES # BLD AUTO: 2.6 K/UL — SIGNIFICANT CHANGE UP (ref 1–3.3)
LYMPHOCYTES # BLD AUTO: 43.8 % — SIGNIFICANT CHANGE UP (ref 13–44)
MCHC RBC-ENTMCNC: 23.3 PG — LOW (ref 27–34)
MCHC RBC-ENTMCNC: 31 GM/DL — LOW (ref 32–36)
MCV RBC AUTO: 75 FL — LOW (ref 80–100)
MONOCYTES # BLD AUTO: 0.44 K/UL — SIGNIFICANT CHANGE UP (ref 0–0.9)
MONOCYTES NFR BLD AUTO: 7.4 % — SIGNIFICANT CHANGE UP (ref 2–14)
NEUTROPHILS # BLD AUTO: 2.5 K/UL — SIGNIFICANT CHANGE UP (ref 1.8–7.4)
NEUTROPHILS NFR BLD AUTO: 42.3 % — LOW (ref 43–77)
NITRITE UR-MCNC: NEGATIVE — SIGNIFICANT CHANGE UP
NRBC # BLD: 0 /100 WBCS — SIGNIFICANT CHANGE UP (ref 0–0)
PH UR: 6 — SIGNIFICANT CHANGE UP (ref 5–8)
PLATELET # BLD AUTO: 235 K/UL — SIGNIFICANT CHANGE UP (ref 150–400)
POTASSIUM SERPL-MCNC: 3.8 MMOL/L — SIGNIFICANT CHANGE UP (ref 3.5–5.3)
POTASSIUM SERPL-SCNC: 3.8 MMOL/L — SIGNIFICANT CHANGE UP (ref 3.5–5.3)
PROT SERPL-MCNC: 7.3 G/DL — SIGNIFICANT CHANGE UP (ref 6–8.3)
PROT UR-MCNC: NEGATIVE MG/DL — SIGNIFICANT CHANGE UP
RBC # BLD: 5.03 M/UL — SIGNIFICANT CHANGE UP (ref 3.8–5.2)
RBC # FLD: 13.7 % — SIGNIFICANT CHANGE UP (ref 10.3–14.5)
RBC CASTS # UR COMP ASSIST: < 5 /HPF — SIGNIFICANT CHANGE UP
SODIUM SERPL-SCNC: 140 MMOL/L — SIGNIFICANT CHANGE UP (ref 135–145)
SP GR SPEC: 1.02 — SIGNIFICANT CHANGE UP (ref 1–1.03)
UROBILINOGEN FLD QL: 0.2 E.U./DL — SIGNIFICANT CHANGE UP
WBC # BLD: 5.93 K/UL — SIGNIFICANT CHANGE UP (ref 3.8–10.5)
WBC # FLD AUTO: 5.93 K/UL — SIGNIFICANT CHANGE UP (ref 3.8–10.5)
WBC UR QL: < 5 /HPF — SIGNIFICANT CHANGE UP

## 2021-06-13 PROCEDURE — 87086 URINE CULTURE/COLONY COUNT: CPT

## 2021-06-13 PROCEDURE — 96374 THER/PROPH/DIAG INJ IV PUSH: CPT

## 2021-06-13 PROCEDURE — 83690 ASSAY OF LIPASE: CPT

## 2021-06-13 PROCEDURE — 99285 EMERGENCY DEPT VISIT HI MDM: CPT

## 2021-06-13 PROCEDURE — 99284 EMERGENCY DEPT VISIT MOD MDM: CPT | Mod: 25

## 2021-06-13 PROCEDURE — 84702 CHORIONIC GONADOTROPIN TEST: CPT

## 2021-06-13 PROCEDURE — 85025 COMPLETE CBC W/AUTO DIFF WBC: CPT

## 2021-06-13 PROCEDURE — 74176 CT ABD & PELVIS W/O CONTRAST: CPT | Mod: 26,MG

## 2021-06-13 PROCEDURE — 81001 URINALYSIS AUTO W/SCOPE: CPT

## 2021-06-13 PROCEDURE — 80053 COMPREHEN METABOLIC PANEL: CPT

## 2021-06-13 PROCEDURE — 36415 COLL VENOUS BLD VENIPUNCTURE: CPT

## 2021-06-13 PROCEDURE — G1004: CPT

## 2021-06-13 PROCEDURE — 74176 CT ABD & PELVIS W/O CONTRAST: CPT

## 2021-06-13 RX ORDER — KETOROLAC TROMETHAMINE 30 MG/ML
15 SYRINGE (ML) INJECTION ONCE
Refills: 0 | Status: DISCONTINUED | OUTPATIENT
Start: 2021-06-13 | End: 2021-06-13

## 2021-06-13 RX ORDER — CYCLOBENZAPRINE HYDROCHLORIDE 10 MG/1
1 TABLET, FILM COATED ORAL
Qty: 15 | Refills: 0
Start: 2021-06-13 | End: 2021-06-17

## 2021-06-13 RX ORDER — SODIUM CHLORIDE 9 MG/ML
1000 INJECTION INTRAMUSCULAR; INTRAVENOUS; SUBCUTANEOUS ONCE
Refills: 0 | Status: COMPLETED | OUTPATIENT
Start: 2021-06-13 | End: 2021-06-13

## 2021-06-13 RX ADMIN — Medication 15 MILLIGRAM(S): at 05:10

## 2021-06-13 RX ADMIN — SODIUM CHLORIDE 1000 MILLILITER(S): 9 INJECTION INTRAMUSCULAR; INTRAVENOUS; SUBCUTANEOUS at 03:53

## 2021-06-13 NOTE — ED ADULT TRIAGE NOTE - HEIGHT IN INCHES
Dr Mae's Office Hours:  Monday: 10:30 - 6:00  Tuesday: 7:00 - 2:30   Wednesday 7:00 - 2:30  Thursday: 7:30 - 3:00  Friday 7:30 - 3:00  Saturday(every 4th): 7:00-11:00    To review your office visit summary and see your test results, please sign up for the [facility] Portal [email or web address].  Patient Education     Prednisone tablets  Brand Names: Deltasone, Predone, Sterapred, Sterapred DS  What is this medicine?  PREDNISONE (PRED ni sone) is a corticosteroid. It is commonly used to treat inflammation of the skin, joints, lungs, and other organs. Common conditions treated include asthma, allergies, and arthritis. It is also used for other conditions, such as blood disorders and diseases of the adrenal glands.  How should I use this medicine?  Take this medicine by mouth with a glass of water. Follow the directions on the prescription label. Take this medicine with food. If you are taking this medicine once a day, take it in the morning. Do not take more medicine than you are told to take. Do not suddenly stop taking your medicine because you may develop a severe reaction. Your doctor will tell you how much medicine to take. If your doctor wants you to stop the medicine, the dose may be slowly lowered over time to avoid any side effects.  Talk to your pediatrician regarding the use of this medicine in children. Special care may be needed.  What side effects may I notice from receiving this medicine?  Side effects that you should report to your doctor or health care professional as soon as possible:  · allergic reactions like skin rash, itching or hives, swelling of the face, lips, or tongue  · changes in emotions or moods  · changes in vision  · depressed mood  · eye pain  · fever or chills, cough, sore throat, pain or difficulty passing urine  · increased thirst  · swelling of ankles, feet  Side effects that usually do not require medical attention (report to your doctor or health care professional if they  continue or are bothersome):  · confusion, excitement, restlessness  · headache  · nausea, vomiting  · skin problems, acne, thin and shiny skin  · trouble sleeping  · weight gain  What may interact with this medicine?  Do not take this medicine with any of the following medications:  · metyrapone  · mifepristone  This medicine may also interact with the following medications:  · aminoglutethimide  · amphotericin B  · aspirin and aspirin-like medicines  · barbiturates  · certain medicines for diabetes, like glipizide or glyburide  · cholestyramine  · cholinesterase inhibitors  · cyclosporine  · digoxin  · diuretics  · ephedrine  · female hormones, like estrogens and birth control pills  · isoniazid  · ketoconazole  · NSAIDS, medicines for pain and inflammation, like ibuprofen or naproxen  · phenytoin  · rifampin  · toxoids  · vaccines  · warfarin  What if I miss a dose?  If you miss a dose, take it as soon as you can. If it is almost time for your next dose, talk to your doctor or health care professional. You may need to miss a dose or take an extra dose. Do not take double or extra doses without advice.  Where should I keep my medicine?  Keep out of the reach of children.  Store at room temperature between 15 and 30 degrees C (59 and 86 degrees F). Protect from light. Keep container tightly closed. Throw away any unused medicine after the expiration date.  What should I tell my health care provider before I take this medicine?  They need to know if you have any of these conditions:  · Cushing's syndrome  · diabetes  · glaucoma  · heart disease  · high blood pressure  · infection (especially a virus infection such as chickenpox, cold sores, or herpes)  · kidney disease  · liver disease  · mental illness  · myasthenia gravis  · osteoporosis  · seizures  · stomach or intestine problems  · thyroid disease  · an unusual or allergic reaction to lactose, prednisone, other medicines, foods, dyes, or  preservatives  · pregnant or trying to get pregnant  · breast-feeding  What should I watch for while using this medicine?  Visit your doctor or health care professional for regular checks on your progress. If you are taking this medicine over a prolonged period, carry an identification card with your name and address, the type and dose of your medicine, and your doctor's name and address.  This medicine may increase your risk of getting an infection. Tell your doctor or health care professional if you are around anyone with measles or chickenpox, or if you develop sores or blisters that do not heal properly.  If you are going to have surgery, tell your doctor or health care professional that you have taken this medicine within the last twelve months.  Ask your doctor or health care professional about your diet. You may need to lower the amount of salt you eat.  This medicine may affect blood sugar levels. If you have diabetes, check with your doctor or health care professional before you change your diet or the dose of your diabetic medicine.  NOTE:This sheet is a summary. It may not cover all possible information. If you have questions about this medicine, talk to your doctor, pharmacist, or health care provider. Copyright© 2018 [employer]           Dr Mae's Office Hours:  Monday: 10:30 - 6:00  Tuesday: 7:00 - 2:30   Wednesday 7:00 - 2:30  Thursday: 7:30 - 3:00  Friday 7:30 - 3:00  Saturday(every 4th): 7:00-11:00    To review your office visit summary and see your test results, please sign up for the [facility] Portal [email or web address].     4

## 2021-06-13 NOTE — ED PROVIDER NOTE - OBJECTIVE STATEMENT
50 F pmh htn, migraines p/w R flank pain since 5am.  pt reports waking up from sleep due to sharp R sided flank pain, intermittent and lasts for about 1 minute then resolves.  Reports decreased amount urine outpt w/ each episodes of urination- no urgency/frequency/hematuria or dysuria.  Also w/ + nausea, no vomiting.  Denies f/c, headache, dizziness, fainting, chest pain, sob, cough, uri sxs, vd, constipation, fall/trauma.

## 2021-06-13 NOTE — ED PROVIDER NOTE - PATIENT PORTAL LINK FT
You can access the FollowMyHealth Patient Portal offered by [facility] by registering at the following website: [email or web address]. By joining [employer]’s FollowMyHealth portal, you will also be able to view your health information using other applications (apps) compatible with our system.

## 2021-06-13 NOTE — ED ADULT TRIAGE NOTE - CHIEF COMPLAINT QUOTE
pt c/o intermittent sharp left flank pain that woke her up out of her sleep 1 hour pta. pt reports decreased po intake today, some nausea. also reports urinary retention since yesterday.

## 2021-06-13 NOTE — ED PROVIDER NOTE - ATTENDING CONTRIBUTION TO CARE
49 yo female h/o lumbar disc herniation, htn, migraines c/o waking w severe R flank pain that is intermittent, sharp, no radiation.  No h/o same.  No abd pain, v/d, dysuria, hematuria.  Pain feels different than her typical back sx, no numbness/weakness in le.  No h/o gallstone, kidney stone.  No cough, sob, change w deep breath.  Well appearing, nad, nc/at, lung cta, heart reg, abd soft, nt, no cvat bilat, ext no gross deformity, back nontender, nl gait, no gross neuro deficits  ? stone, pyelo (no uti sx), msk back pain, no sx of cough, fever, sob to suggest rll pna or lung etiology, no abd pain or ruq ttp to suggest biliary etiology.  Plan labs, pain meds, ct for stone; reassess.

## 2021-06-13 NOTE — ED PROVIDER NOTE - PROGRESS NOTE DETAILS
labs wnl, UA + blood (currently menstruating), no infection.  CT a/p shows no evidence of renal stone/hydro, no other acute pathology.  pain improved w/ toradol, likely msk pain in setting no renal stone.  will dc with flexeril and nsaids.  can f/u outpt w/ pmd.  discussed strict return parameters

## 2021-06-13 NOTE — ED PROVIDER NOTE - PHYSICAL EXAMINATION
Vitals reviewed  Gen: well appearing, nad, speaking in full sentences  Skin: wwp, no rash/lesions  HEENT: ncat, eomi, mmm  CV: rrr, no audible m/r/g  Resp: symmetrical expansion, ctab, no w/r/r  Abd: nondistended, soft, nontender, no rebound/guarding, no cvat   Ext: FROM throughout, no peripheral edema  Neuro: alert/oriented, no focal deficits, steady gait

## 2021-06-13 NOTE — ED ADULT NURSE NOTE - OBJECTIVE STATEMENT
pt is 50y female, here for RT flank pain, inability to empty bladder completely and nausea that started this am, pt denies any abd pain/vomiting/diarrhea/fevers, also denies any pain on urination or blood in urine, denies any hx of kidney stones, pt is A&ox3, ambulatory with steady gait, abd soft, non-distended, non-tender, RT CVA tenderness noted, NAD present

## 2021-06-13 NOTE — ED PROVIDER NOTE - NSFOLLOWUPINSTRUCTIONS_ED_ALL_ED_FT
Take tylenol 650mg or motrin 600mg for pain every 4-6 hours.  You can also take flexeril (muscle relaxer) as prescribed for pain but do not drive/operate heavy machinery as it can make you drowsy      Flank Pain, Adult    Flank pain is pain that is located on the side of the body between the upper abdomen and the back. This area is called the flank. The pain may occur over a short period of time (acute), or it may be long-term or recurring (chronic). It may be mild or severe. Flank pain can be caused by many things, including:  •Muscle soreness or injury.      •Kidney stones or kidney disease.      •Stress.      •A disease of the spine (vertebral disk disease).      •A lung infection (pneumonia).      •Fluid around the lungs (pulmonary edema).      •A skin rash caused by the chickenpox virus (shingles).      •Tumors that affect the back of the abdomen.      •Gallbladder disease.        Follow these instructions at home:     •Drink enough fluid to keep your urine clear or pale yellow.      •Rest as told by your health care provider.      •Take over-the-counter and prescription medicines only as told by your health care provider.      •Keep a journal to track what has caused your flank pain and what has made it feel better.      •Keep all follow-up visits as told by your health care provider. This is important.        Contact a health care provider if:    •Your pain is not controlled with medicine.      •You have new symptoms.      •Your pain gets worse.      •You have a fever.      •Your symptoms last longer than 2–3 days.      •You have trouble urinating or you are urinating very frequently.        Get help right away if:    •You have trouble breathing or you are short of breath.      •Your abdomen hurts or it is swollen or red.      •You have nausea or vomiting.      •You feel faint or you pass out.      •You have blood in your urine.        Summary    •Flank pain is pain that is located on the side of the body between the upper abdomen and the back.      •The pain may occur over a short period of time (acute), or it may be long-term or recurring (chronic). It may be mild or severe.      •Flank pain can be caused by many things.      •Contact your health care provider if your symptoms get worse or they last longer than 2–3 days.      This information is not intended to replace advice given to you by your health care provider. Make sure you discuss any questions you have with your health care provider.

## 2021-06-13 NOTE — ED PROVIDER NOTE - CLINICAL SUMMARY MEDICAL DECISION MAKING FREE TEXT BOX
50 F pmh htn, migraines p/w R flank pain since 5am, intermittent w/ associated nausea.  pt afebrile, appears comfortable, abd soft/nt, no cvat.  history concerning for renal stone, consider msk pain, uti/pyelo, less likely biliary colic.  will obtain labs, UA/cult, CT a/p and give ivf/toradol

## 2021-06-13 NOTE — ED ADULT NURSE NOTE - NSIMPLEMENTINTERV_GEN_ALL_ED
Implemented All Universal Safety Interventions:  [place] to call system. Call bell, personal items and telephone within reach. Instruct patient to call for assistance. Room bathroom lighting operational. Non-slip footwear when patient is off stretcher. Physically safe environment: no spills, clutter or unnecessary equipment. Stretcher in lowest position, wheels locked, appropriate side rails in place.

## 2021-06-14 LAB
CULTURE RESULTS: SIGNIFICANT CHANGE UP
SPECIMEN SOURCE: SIGNIFICANT CHANGE UP

## 2021-12-15 NOTE — H&P ADULT - PROBLEM SELECTOR PLAN 2
Loose, watery, non-bloody yellow-brown color x 1 week. Etiology unclear, could be side effect of topiramate vs infectious vs autoimmune  - CT abdo/pain w/ PO and IV contrast   - stool culture, ova/parasites, GI PCR panel  - stool osm/Na/K to determine osmolar gap   - tissuetransglut Ab  - DOTTY/ESR/CRP  - c/w IVF @ 100cc/hr  - Imodium and Zofran PRN
0 = swallows foods/liquids without difficulty

## 2022-03-02 ENCOUNTER — NON-APPOINTMENT (OUTPATIENT)
Age: 52
End: 2022-03-02

## 2022-03-02 ENCOUNTER — APPOINTMENT (OUTPATIENT)
Dept: PHYSICAL MEDICINE AND REHAB | Facility: CLINIC | Age: 52
End: 2022-03-02
Payer: MEDICARE

## 2022-03-02 VITALS
SYSTOLIC BLOOD PRESSURE: 150 MMHG | HEIGHT: 65 IN | WEIGHT: 235 LBS | DIASTOLIC BLOOD PRESSURE: 94 MMHG | HEART RATE: 78 BPM | BODY MASS INDEX: 39.15 KG/M2 | RESPIRATION RATE: 18 BRPM

## 2022-03-02 DIAGNOSIS — G56.22 LESION OF ULNAR NERVE, LEFT UPPER LIMB: ICD-10-CM

## 2022-03-02 PROCEDURE — 95912 NRV CNDJ TEST 11-12 STUDIES: CPT

## 2022-03-02 PROCEDURE — 95886 MUSC TEST DONE W/N TEST COMP: CPT

## 2022-03-08 ENCOUNTER — APPOINTMENT (OUTPATIENT)
Dept: ORTHOPEDIC SURGERY | Facility: CLINIC | Age: 52
End: 2022-03-08
Payer: MEDICARE

## 2022-03-15 ENCOUNTER — APPOINTMENT (OUTPATIENT)
Dept: ORTHOPEDIC SURGERY | Facility: CLINIC | Age: 52
End: 2022-03-15
Payer: MEDICARE

## 2022-03-15 ENCOUNTER — TRANSCRIPTION ENCOUNTER (OUTPATIENT)
Age: 52
End: 2022-03-15

## 2022-03-15 VITALS — WEIGHT: 235 LBS | RESPIRATION RATE: 16 BRPM | HEIGHT: 65 IN | BODY MASS INDEX: 39.15 KG/M2

## 2022-03-15 DIAGNOSIS — Z82.61 FAMILY HISTORY OF ARTHRITIS: ICD-10-CM

## 2022-03-15 DIAGNOSIS — Z86.79 PERSONAL HISTORY OF OTHER DISEASES OF THE CIRCULATORY SYSTEM: ICD-10-CM

## 2022-03-15 DIAGNOSIS — Z87.891 PERSONAL HISTORY OF NICOTINE DEPENDENCE: ICD-10-CM

## 2022-03-15 DIAGNOSIS — Z80.9 FAMILY HISTORY OF MALIGNANT NEOPLASM, UNSPECIFIED: ICD-10-CM

## 2022-03-15 PROCEDURE — 73110 X-RAY EXAM OF WRIST: CPT | Mod: LT,RT

## 2022-03-15 PROCEDURE — 99204 OFFICE O/P NEW MOD 45 MIN: CPT

## 2022-03-15 RX ORDER — LOSARTAN POTASSIUM 100 MG/1
TABLET, FILM COATED ORAL
Refills: 0 | Status: ACTIVE | COMMUNITY

## 2022-03-15 RX ORDER — DICLOFENAC 35 MG/1
CAPSULE ORAL
Refills: 0 | Status: ACTIVE | COMMUNITY

## 2022-03-15 RX ORDER — TIZANIDINE HYDROCHLORIDE 6 MG/1
CAPSULE ORAL
Refills: 0 | Status: ACTIVE | COMMUNITY

## 2022-03-15 RX ORDER — MONTELUKAST SODIUM 10 MG/1
TABLET, FILM COATED ORAL
Refills: 0 | Status: ACTIVE | COMMUNITY

## 2022-03-21 LAB
ALBUMIN SERPL ELPH-MCNC: 4.7 G/DL
ALP BLD-CCNC: 95 U/L
ALT SERPL-CCNC: 31 U/L
ANION GAP SERPL CALC-SCNC: 12 MMOL/L
AST SERPL-CCNC: 26 U/L
BASOPHILS # BLD AUTO: 0.03 K/UL
BASOPHILS NFR BLD AUTO: 0.8 %
BILIRUB SERPL-MCNC: 0.4 MG/DL
BUN SERPL-MCNC: 12 MG/DL
CALCIUM SERPL-MCNC: 10.1 MG/DL
CHLORIDE SERPL-SCNC: 101 MMOL/L
CO2 SERPL-SCNC: 27 MMOL/L
CREAT SERPL-MCNC: 0.74 MG/DL
EGFR: 97 ML/MIN/1.73M2
EOSINOPHIL # BLD AUTO: 0.11 K/UL
EOSINOPHIL NFR BLD AUTO: 3 %
GLUCOSE SERPL-MCNC: 126 MG/DL
HCT VFR BLD CALC: 40.8 %
HGB BLD-MCNC: 12 G/DL
IMM GRANULOCYTES NFR BLD AUTO: 0.3 %
INR PPP: 0.95 RATIO
LYMPHOCYTES # BLD AUTO: 1.22 K/UL
LYMPHOCYTES NFR BLD AUTO: 33.8 %
MAN DIFF?: NORMAL
MCHC RBC-ENTMCNC: 23 PG
MCHC RBC-ENTMCNC: 29.4 GM/DL
MCV RBC AUTO: 78.3 FL
MONOCYTES # BLD AUTO: 0.26 K/UL
MONOCYTES NFR BLD AUTO: 7.2 %
NEUTROPHILS # BLD AUTO: 1.98 K/UL
NEUTROPHILS NFR BLD AUTO: 54.9 %
PLATELET # BLD AUTO: 240 K/UL
POTASSIUM SERPL-SCNC: 5.1 MMOL/L
PROT SERPL-MCNC: 7 G/DL
PT BLD: 11.1 SEC
RBC # BLD: 5.21 M/UL
RBC # FLD: 14.6 %
SODIUM SERPL-SCNC: 140 MMOL/L
WBC # FLD AUTO: 3.61 K/UL

## 2022-03-24 RX ORDER — ACETAMINOPHEN AND CODEINE 300; 30 MG/1; MG/1
300-30 TABLET ORAL
Qty: 20 | Refills: 0 | Status: ACTIVE | COMMUNITY
Start: 2022-03-24 | End: 1900-01-01

## 2022-03-25 ENCOUNTER — APPOINTMENT (OUTPATIENT)
Age: 52
End: 2022-03-25
Payer: MEDICARE

## 2022-03-25 PROCEDURE — 29848 WRIST ENDOSCOPY/SURGERY: CPT | Mod: LT

## 2022-04-05 ENCOUNTER — APPOINTMENT (OUTPATIENT)
Dept: ORTHOPEDIC SURGERY | Facility: CLINIC | Age: 52
End: 2022-04-05
Payer: MEDICARE

## 2022-04-05 PROCEDURE — 99024 POSTOP FOLLOW-UP VISIT: CPT

## 2022-04-26 ENCOUNTER — NON-APPOINTMENT (OUTPATIENT)
Age: 52
End: 2022-04-26

## 2022-05-10 ENCOUNTER — APPOINTMENT (OUTPATIENT)
Dept: ORTHOPEDIC SURGERY | Facility: CLINIC | Age: 52
End: 2022-05-10

## 2022-06-06 PROBLEM — G56.03 BILATERAL CARPAL TUNNEL SYNDROME: Status: ACTIVE | Noted: 2022-03-02

## 2022-06-09 ENCOUNTER — APPOINTMENT (OUTPATIENT)
Dept: ORTHOPEDIC SURGERY | Facility: CLINIC | Age: 52
End: 2022-06-09

## 2022-06-09 DIAGNOSIS — G56.03 CARPAL TUNNEL SYNDROM,BILATERAL UPPER LIMBS: ICD-10-CM

## 2022-10-14 ENCOUNTER — EMERGENCY (EMERGENCY)
Facility: HOSPITAL | Age: 52
LOS: 1 days | Discharge: HOME | End: 2022-10-14
Attending: EMERGENCY MEDICINE | Admitting: EMERGENCY MEDICINE
Payer: MEDICARE

## 2022-10-14 VITALS
DIASTOLIC BLOOD PRESSURE: 91 MMHG | HEART RATE: 98 BPM | OXYGEN SATURATION: 99 % | SYSTOLIC BLOOD PRESSURE: 152 MMHG | RESPIRATION RATE: 18 BRPM | HEIGHT: 64 IN | TEMPERATURE: 98 F

## 2022-10-14 DIAGNOSIS — Z98.890 OTHER SPECIFIED POSTPROCEDURAL STATES: Chronic | ICD-10-CM

## 2022-10-14 PROCEDURE — 99285 EMERGENCY DEPT VISIT HI MDM: CPT

## 2022-10-14 NOTE — ED ADULT TRIAGE NOTE - PATIENT ON (OXYGEN DELIVERY METHOD)
room air Additional Notes: Patient consent was obtained to proceed with the visit and recommended plan of care after discussion of all risks and benefits, including the risks of COVID-19 exposure. Detail Level: Simple

## 2022-10-14 NOTE — ED ADULT TRIAGE NOTE - ARRIVAL INFO ADDITIONAL COMMENTS
pt has had a headache for 3 weeks.   has been seen by [facility] ER and a neurologist who both told her it was a migraine.   pt is seeing pain management for a lumbar herniated disc.  pt had a spinal injection just prior to getting the headache.   now pt thinks it may be a leak.

## 2022-10-14 NOTE — ED ADULT TRIAGE NOTE - AS HEIGHT TYPE
Retinoid Dermatitis Aggressive Treatment: I recommended more frequent application of Cetaphil or CeraVe to the areas of dermatitis. I also prescribed a topical steroid for twice daily use until the dermatitis resolves. Dosing Month 1 (Required For Cumulative Dosing): 40mg Daily Hypercholesterolemia Monitoring: I explained this is common when taking isotretinoin. We will monitor closely. Myalgia Treatment: I explained this is common when taking isotretinoin. If this worsens they will contact us. They may try OTC ibuprofen. Dosing Month 4 (Required For Cumulative Dosing): 30mg BID Cheilitis Normal Treatment: I recommended application of Vaseline or Aquaphor numerous times a day (as often as every hour) and before going to bed. Cheilitis Aggressive Treatment: I recommended application of Vaseline or Aquaphor numerous times a day (as often as every hour) and before going to bed. I also prescribed a topical steroid for twice daily use. Show How Many Months Of Anticipated Therapy Are Left: No stated Hypertriglyceridemia Monitoring: I explained this is common when taking isotretinoin. If this worsens they will contact us. Xerosis Normal Treatment: I recommended application of Cetaphil or CeraVe numerous times a day and before going to bed to all dry areas. Is Xerosis Present?: Yes - Normal Treatment Detail Level: Zone Female Pregnancy Counseling Text: Female patients should also be on two forms of birth control while taking this medication and for one month after their last dose. Pounds Preamble Statement (Weight Entered In Details Tab): Reported Weight in pounds: Retinoid Dermatitis Normal Treatment: I recommended more frequent application of Cetaphil or CeraVe to the areas of dermatitis. Lower Range (In Mg/Kg): [address] Headache Monitoring: I recommended monitoring the headaches for now. There is no evidence of increased intracranial pressure. They were instructed to call if the headaches are worsening. Xerosis Normal Treatment: I recommended application of Cetaphil or CeraVe numerous times a day going to bed to all dry areas. What Is The Patient's Gender: Female Xerosis Aggressive Treatment: I recommended application of Cetaphil or CeraVe numerous times a day and before going to bed to all dry areas. I also prescribed a topical steroid for twice daily use. Nosebleeds Normal Treatment: I explained this is common when taking isotretinoin. I recommended saline mist in each nostril multiple times a day. If this worsens they will contact us. Upper Range (In Mg/Kg): [address] Include Validation In Note: Yes Male Completion Statement: After discussing his treatment course we decided to discontinue isotretinoin therapy at this time. He shouldn't donate blood for one month after the last dose. He should call with any new symptoms of depression. Comments: Start month 7 Patient Weight (Optional But Required For Cumulative Dose-Numbers And Decimals Only): [address] [facility] [address] Kilograms Preamble Statement (Weight Entered In Details Tab): Reported Weight in kilograms: Xerosis Aggressive Treatment: I recommended application of Cetaphil or CeraVe numerous times a day going to bed to all dry areas. I also prescribed a topical steroid for twice daily use. Months Of Therapy Completed: 6 Weight Units: pounds Target Cumulative Dosage (In Mg/Kg): [address] Counseling Text: I reviewed the side effect in detail. Patient should get monthly blood tests, not donate blood, not drive at night if vision affected, and not share medication. Female Completion Statement: After discussing her treatment course we decided to discontinue isotretinoin therapy at this time. I explained that she would need to continue her birth control methods for at least one month after the last dosage. She should also get a pregnancy test one month after the last dose. She shouldn't donate blood for one month after the last dose. She should call with any new symptoms of depression. Ipledge Number (Optional): [id number] Use Therapeutic Ranged Or Therapeutic Target: please select Range or Target

## 2022-10-15 VITALS
DIASTOLIC BLOOD PRESSURE: 71 MMHG | TEMPERATURE: 98 F | HEART RATE: 87 BPM | SYSTOLIC BLOOD PRESSURE: 136 MMHG | OXYGEN SATURATION: 98 % | RESPIRATION RATE: 16 BRPM

## 2022-10-15 LAB
ALBUMIN SERPL ELPH-MCNC: 4.7 G/DL — SIGNIFICANT CHANGE UP (ref 3.3–5)
ALP SERPL-CCNC: 108 U/L — SIGNIFICANT CHANGE UP (ref 40–120)
ALT FLD-CCNC: 41 U/L — SIGNIFICANT CHANGE UP (ref 10–45)
ANION GAP SERPL CALC-SCNC: 12 MMOL/L — SIGNIFICANT CHANGE UP (ref 5–17)
AST SERPL-CCNC: 31 U/L — SIGNIFICANT CHANGE UP (ref 10–40)
BASOPHILS # BLD AUTO: 0.03 K/UL — SIGNIFICANT CHANGE UP (ref 0–0.2)
BASOPHILS NFR BLD AUTO: 0.5 % — SIGNIFICANT CHANGE UP (ref 0–2)
BILIRUB SERPL-MCNC: 0.2 MG/DL — SIGNIFICANT CHANGE UP (ref 0.2–1.2)
BUN SERPL-MCNC: 12 MG/DL — SIGNIFICANT CHANGE UP (ref 7–23)
CALCIUM SERPL-MCNC: 10 MG/DL — SIGNIFICANT CHANGE UP (ref 8.4–10.5)
CHLORIDE SERPL-SCNC: 103 MMOL/L — SIGNIFICANT CHANGE UP (ref 96–108)
CO2 SERPL-SCNC: 26 MMOL/L — SIGNIFICANT CHANGE UP (ref 22–31)
CREAT SERPL-MCNC: 0.79 MG/DL — SIGNIFICANT CHANGE UP (ref 0.5–1.3)
EGFR: 89 ML/MIN/1.73M2 — SIGNIFICANT CHANGE UP
EOSINOPHIL # BLD AUTO: 0.21 K/UL — SIGNIFICANT CHANGE UP (ref 0–0.5)
EOSINOPHIL NFR BLD AUTO: 3.3 % — SIGNIFICANT CHANGE UP (ref 0–6)
GLUCOSE SERPL-MCNC: 116 MG/DL — HIGH (ref 70–99)
HCG SERPL-ACNC: 7 MIU/ML — SIGNIFICANT CHANGE UP
HCT VFR BLD CALC: 38.9 % — SIGNIFICANT CHANGE UP (ref 34.5–45)
HGB BLD-MCNC: 12 G/DL — SIGNIFICANT CHANGE UP (ref 11.5–15.5)
IMM GRANULOCYTES NFR BLD AUTO: 0.3 % — SIGNIFICANT CHANGE UP (ref 0–0.9)
LYMPHOCYTES # BLD AUTO: 2.17 K/UL — SIGNIFICANT CHANGE UP (ref 1–3.3)
LYMPHOCYTES # BLD AUTO: 34.3 % — SIGNIFICANT CHANGE UP (ref 13–44)
MCHC RBC-ENTMCNC: 23.2 PG — LOW (ref 27–34)
MCHC RBC-ENTMCNC: 30.8 GM/DL — LOW (ref 32–36)
MCV RBC AUTO: 75.1 FL — LOW (ref 80–100)
MONOCYTES # BLD AUTO: 0.44 K/UL — SIGNIFICANT CHANGE UP (ref 0–0.9)
MONOCYTES NFR BLD AUTO: 7 % — SIGNIFICANT CHANGE UP (ref 2–14)
NEUTROPHILS # BLD AUTO: 3.45 K/UL — SIGNIFICANT CHANGE UP (ref 1.8–7.4)
NEUTROPHILS NFR BLD AUTO: 54.6 % — SIGNIFICANT CHANGE UP (ref 43–77)
NRBC # BLD: 0 /100 WBCS — SIGNIFICANT CHANGE UP (ref 0–0)
PLATELET # BLD AUTO: 233 K/UL — SIGNIFICANT CHANGE UP (ref 150–400)
POTASSIUM SERPL-MCNC: 3.8 MMOL/L — SIGNIFICANT CHANGE UP (ref 3.5–5.3)
POTASSIUM SERPL-SCNC: 3.8 MMOL/L — SIGNIFICANT CHANGE UP (ref 3.5–5.3)
PROT SERPL-MCNC: 7.4 G/DL — SIGNIFICANT CHANGE UP (ref 6–8.3)
RBC # BLD: 5.18 M/UL — SIGNIFICANT CHANGE UP (ref 3.8–5.2)
RBC # FLD: 13.9 % — SIGNIFICANT CHANGE UP (ref 10.3–14.5)
SODIUM SERPL-SCNC: 141 MMOL/L — SIGNIFICANT CHANGE UP (ref 135–145)
WBC # BLD: 6.32 K/UL — SIGNIFICANT CHANGE UP (ref 3.8–10.5)
WBC # FLD AUTO: 6.32 K/UL — SIGNIFICANT CHANGE UP (ref 3.8–10.5)

## 2022-10-15 PROCEDURE — 70450 CT HEAD/BRAIN W/O DYE: CPT | Mod: 26,MA

## 2022-10-15 PROCEDURE — 99284 EMERGENCY DEPT VISIT MOD MDM: CPT | Mod: 25

## 2022-10-15 PROCEDURE — 36415 COLL VENOUS BLD VENIPUNCTURE: CPT

## 2022-10-15 PROCEDURE — 96374 THER/PROPH/DIAG INJ IV PUSH: CPT

## 2022-10-15 PROCEDURE — 96361 HYDRATE IV INFUSION ADD-ON: CPT

## 2022-10-15 PROCEDURE — 96375 TX/PRO/DX INJ NEW DRUG ADDON: CPT

## 2022-10-15 PROCEDURE — 85025 COMPLETE CBC W/AUTO DIFF WBC: CPT

## 2022-10-15 PROCEDURE — 70450 CT HEAD/BRAIN W/O DYE: CPT | Mod: MA

## 2022-10-15 PROCEDURE — 84702 CHORIONIC GONADOTROPIN TEST: CPT

## 2022-10-15 PROCEDURE — 80053 COMPREHEN METABOLIC PANEL: CPT

## 2022-10-15 RX ORDER — SODIUM CHLORIDE 9 MG/ML
1000 INJECTION INTRAMUSCULAR; INTRAVENOUS; SUBCUTANEOUS ONCE
Refills: 0 | Status: COMPLETED | OUTPATIENT
Start: 2022-10-15 | End: 2022-10-15

## 2022-10-15 RX ORDER — METOCLOPRAMIDE HCL 10 MG
1 TABLET ORAL
Qty: 9 | Refills: 0
Start: 2022-10-15

## 2022-10-15 RX ORDER — METOCLOPRAMIDE HCL 10 MG
10 TABLET ORAL ONCE
Refills: 0 | Status: COMPLETED | OUTPATIENT
Start: 2022-10-15 | End: 2022-10-15

## 2022-10-15 RX ORDER — METOCLOPRAMIDE HCL 10 MG
1 TABLET ORAL
Qty: 10 | Refills: 0
Start: 2022-10-15

## 2022-10-15 RX ORDER — KETOROLAC TROMETHAMINE 30 MG/ML
15 SYRINGE (ML) INJECTION ONCE
Refills: 0 | Status: DISCONTINUED | OUTPATIENT
Start: 2022-10-15 | End: 2022-10-15

## 2022-10-15 RX ORDER — DIPHENHYDRAMINE HCL 50 MG
25 CAPSULE ORAL ONCE
Refills: 0 | Status: COMPLETED | OUTPATIENT
Start: 2022-10-15 | End: 2022-10-15

## 2022-10-15 RX ORDER — DICLOFENAC SODIUM 75 MG/1
1 TABLET, DELAYED RELEASE ORAL
Qty: 10 | Refills: 0
Start: 2022-10-15

## 2022-10-15 RX ADMIN — SODIUM CHLORIDE 2000 MILLILITER(S): 9 INJECTION INTRAMUSCULAR; INTRAVENOUS; SUBCUTANEOUS at 01:01

## 2022-10-15 RX ADMIN — SODIUM CHLORIDE 1000 MILLILITER(S): 9 INJECTION INTRAMUSCULAR; INTRAVENOUS; SUBCUTANEOUS at 02:18

## 2022-10-15 RX ADMIN — Medication 25 MILLIGRAM(S): at 01:00

## 2022-10-15 RX ADMIN — Medication 15 MILLIGRAM(S): at 01:01

## 2022-10-15 RX ADMIN — Medication 10 MILLIGRAM(S): at 01:01

## 2022-10-15 RX ADMIN — Medication 15 MILLIGRAM(S): at 02:18

## 2022-10-15 NOTE — ED ADULT NURSE NOTE - OBJECTIVE STATEMENT
Pt is a 52yoF presenting to the ED for generalized ha x 1 week. Pt is axox3, ambulatory. Reports that she has had this pain for the past few days, has been f/u with neurologist and was told it was migraines. Endorsing photophobia, denies vision changes. Denies nausea/vomiting.

## 2022-10-15 NOTE — ED PROVIDER NOTE - NSICDXPASTMEDICALHX_GEN_ALL_CORE_FT
PAST MEDICAL HISTORY:  Chronic back pain     Essential hypertension     Herniated lumbar intervertebral disc     Migraine with status migrainosus, not intractable, unspecified migraine type

## 2022-10-15 NOTE — ED PROVIDER NOTE - NSFOLLOWUPINSTRUCTIONS_ED_ALL_ED_FT
Headache    A headache is pain or discomfort felt around the head or neck area. The specific cause of a headache may not be found as there are many types including tension headaches, migraine headaches, and cluster headaches. Watch your condition for any changes. Things you can do to manage your pain include taking over the counter and prescription medications as instructed by your health care provider, lying down in a dark quiet room, limiting stress, getting regular sleep, and refraining from alcohol and tobacco products.    SEEK IMMEDIATE MEDICAL CARE IF YOU HAVE ANY OF THE FOLLOWING SYMPTOMS: fever, vomiting, stiff neck, loss of vision, problems with speech, muscle weakness, loss of balance, trouble walking, passing out, or confusion.    [facility]  Family Medicine  [address], [address] [place] [address]  Phone: [phone]

## 2022-10-15 NOTE — ED PROVIDER NOTE - OBJECTIVE STATEMENT
51 y/o F with PMHx migraines, HTN, and 4 herniated discs presents to ED c/o headache for the past month that began after spinal injection on 9/17. Headache has been persistent and described as a squeezing sensation to the top and back of her head, radiating to her neck. Also c/o lightheadedness and worsening floaters today. Pt was seen at outside ED 3 weeks ago and treated with migraine cocktail without significant relief. Pt was referred to a neurologist who started her on migraine pen, nurtec, and prednisone. She has been taking these medications without significant relief. Pt had a follow up appointment with her pain management doctor who gave her ubrelvy which worsened her headache. Pt had another follow up this week and was referred to ED with concern for CSF leak. Pt is scheduled to have blood patch next week. Pt has not had any imaging of her head. Denies numbness or weakness of extremities, head trauma, or SOB. Meds include losartan 25mg, rizatriptan 10mg, and aimovig injection once per month. 51 y/o F with PMHx migraines, HTN, and 4 herniated discs presents to ED c/o headache for the past month that began after spinal injection on 9/17. Headache has been persistent and described as a squeezing sensation to the top and back of her head, radiating to her neck. Also c/o lightheadedness and floaters today. Pt was seen at outside ED 3 weeks ago and treated with migraine cocktail without significant relief. Pt was referred to a neurologist who started her on migraine pen, nurtec, and prednisone. She has been taking these medications without significant relief. Pt had a follow up appointment with her pain management doctor who gave her ubrelvy which worsened her headache. Pt had another follow up this week and was referred to ED with concern for CSF leak. Pt is scheduled to have blood patch next week. Pt has not had any imaging of her head. Denies numbness or weakness of extremities, head trauma, or SOB. Meds include losartan 25mg, rizatriptan 10mg, and aimovig injection once per month.

## 2022-10-15 NOTE — ED PROVIDER NOTE - CLINICAL SUMMARY MEDICAL DECISION MAKING FREE TEXT BOX
Pt w hx of migraines and ?CSF leak, no focal neuro deficits, fever, meningismus, will obtain CThead given no history of imaging per pt, labs, symptom control, reassess.

## 2022-10-15 NOTE — ED PROVIDER NOTE - CARE PROVIDER_API CALL
Leonardo Fowler)  Neurology; Neuromuscular Medicine  [address] [facility], [place] [address]  Phone: [phone]  Fax: [phone]  Follow Up Time:

## 2022-10-15 NOTE — ED PROVIDER NOTE - PROGRESS NOTE DETAILS
CThead wnl, pain improved feels comfortable for dc, requesting toradol/reglan on dc, states will call for neurology follow up next week and has upcoming blood patch placement.

## 2022-10-15 NOTE — ED ADULT NURSE REASSESSMENT NOTE - NS ED NURSE REASSESS COMMENT FT1
Patient ambulated well without assistance, reviewed lab results with patient. Reviewed return precautions with patient, verbalized understanding. Endorsing sx relief.
Patient ambulated well and taken for imaging at this time.

## 2022-10-15 NOTE — ED PROVIDER NOTE - PATIENT PORTAL LINK FT
You can access the FollowMyHealth Patient Portal offered by [facility] by registering at the following website: [email or web address]. By joining [facility] [employer]’s FollowMyHealth portal, you will also be able to view your health information using other applications (apps) compatible with our system.

## 2022-10-17 DIAGNOSIS — I10 ESSENTIAL (PRIMARY) HYPERTENSION: ICD-10-CM

## 2022-10-17 DIAGNOSIS — G43.901 MIGRAINE, UNSPECIFIED, NOT INTRACTABLE, WITH STATUS MIGRAINOSUS: ICD-10-CM

## 2022-10-17 DIAGNOSIS — Z87.39 PERSONAL HISTORY OF OTHER DISEASES OF THE MUSCULOSKELETAL SYSTEM AND CONNECTIVE TISSUE: ICD-10-CM

## 2022-10-17 DIAGNOSIS — M54.2 CERVICALGIA: ICD-10-CM

## 2022-10-17 DIAGNOSIS — R51.9 HEADACHE, UNSPECIFIED: ICD-10-CM

## 2022-10-17 DIAGNOSIS — Z90.49 ACQUIRED ABSENCE OF OTHER SPECIFIED PARTS OF DIGESTIVE TRACT: ICD-10-CM

## 2023-06-16 NOTE — ED ADULT NURSE NOTE - NS ED NURSE REPORT GIVEN DT
[FreeTextEntry1] : A sterile straight catheterization was performed to measure postvoid residual volume, which was 12 ml.
09-Mar-2018 03:01

## 2023-07-23 NOTE — ED ADULT NURSE NOTE - CAS DISCH CONDITION
Assessment:  - patient found to have a UTI  - UA grossly positive  - CT shows cystitis Circumferential bladder wall thickening, which can be seen in the setting of cystitis. Recommend correlation with urinalysis    Plan:  - c/w ceftriaxone  - f/u  urine culture  - will hold off getting blood cultures and hep c testing as the patient is anemic to avoid unnecessary blood draws  - no abdominal pain, monitor for cholecystitis for now
Assessment:  - patient found to have a UTI  - UA grossly positive  - CT shows cystitis Circumferential bladder wall thickening, which can be seen in the setting of cystitis. Recommend correlation with urinalysis    Plan:  - c/w ceftriaxone  - f/u  urine culture--> >100k Ecoli awaiting sensitivities   - will hold off getting blood cultures and hep c testing as the patient is anemic to avoid unnecessary blood draws
Assessment:  - patient found to have a UTI  - UA grossly positive  - CT shows cystitis Circumferential bladder wall thickening, which can be seen in the setting of cystitis. Recommend correlation with urinalysis    Plan:  - c/w ceftriaxone  - f/u  urine culture--> >100k Ecoli awaiting sensitivities   - will hold off getting blood cultures and hep c testing as the patient is anemic to avoid unnecessary blood draws  - no abdominal pain, monitor for cholecystitis for now
Stable

## 2023-09-19 ENCOUNTER — APPOINTMENT (OUTPATIENT)
Dept: ORTHOPEDIC SURGERY | Facility: CLINIC | Age: 53
End: 2023-09-19

## 2024-02-19 NOTE — ED PROVIDER NOTE - RADIOLOGY FINDINGS
Hey,       I posted results from your mammogram.  It was normal so that is great.  The radiologist did comment on you having dense breasts though. This can sometimes lower the accuracy of mammograms.      If you would like you could go for an optional test which is a breast ultrasound.    This along with the results of your mammogram can improve the sensitivity of your breast cancer screen.    Let me know if you want to go for the ultrasound and I will put an order in for you    -Shawn Colorado MD   
reviewed by me

## 2024-03-30 ENCOUNTER — EMERGENCY (EMERGENCY)
Facility: HOSPITAL | Age: 54
LOS: 1 days | Discharge: HOME | End: 2024-03-30
Attending: STUDENT IN AN ORGANIZED HEALTH CARE EDUCATION/TRAINING PROGRAM | Admitting: STUDENT IN AN ORGANIZED HEALTH CARE EDUCATION/TRAINING PROGRAM
Payer: MEDICARE

## 2024-03-30 VITALS
HEIGHT: 64 IN | HEART RATE: 84 BPM | WEIGHT: 214.95 LBS | SYSTOLIC BLOOD PRESSURE: 137 MMHG | DIASTOLIC BLOOD PRESSURE: 82 MMHG | TEMPERATURE: 98 F | OXYGEN SATURATION: 97 % | RESPIRATION RATE: 18 BRPM

## 2024-03-30 VITALS
DIASTOLIC BLOOD PRESSURE: 79 MMHG | RESPIRATION RATE: 18 BRPM | SYSTOLIC BLOOD PRESSURE: 125 MMHG | HEART RATE: 73 BPM | OXYGEN SATURATION: 98 %

## 2024-03-30 DIAGNOSIS — Z98.890 OTHER SPECIFIED POSTPROCEDURAL STATES: Chronic | ICD-10-CM

## 2024-03-30 LAB
ADD ON TEST-SPECIMEN IN LAB: SIGNIFICANT CHANGE UP
ANION GAP SERPL CALC-SCNC: 9 MMOL/L — SIGNIFICANT CHANGE UP (ref 5–17)
APPEARANCE UR: CLEAR — SIGNIFICANT CHANGE UP
BACTERIA # UR AUTO: NEGATIVE /HPF — SIGNIFICANT CHANGE UP
BILIRUB UR-MCNC: NEGATIVE — SIGNIFICANT CHANGE UP
BUN SERPL-MCNC: 11 MG/DL — SIGNIFICANT CHANGE UP (ref 7–23)
CALCIUM SERPL-MCNC: 9.9 MG/DL — SIGNIFICANT CHANGE UP (ref 8.4–10.5)
CHLORIDE SERPL-SCNC: 102 MMOL/L — SIGNIFICANT CHANGE UP (ref 96–108)
CO2 SERPL-SCNC: 29 MMOL/L — SIGNIFICANT CHANGE UP (ref 22–31)
COLOR SPEC: YELLOW — SIGNIFICANT CHANGE UP
CREAT SERPL-MCNC: 0.73 MG/DL — SIGNIFICANT CHANGE UP (ref 0.5–1.3)
DIFF PNL FLD: NEGATIVE — SIGNIFICANT CHANGE UP
EGFR: 98 ML/MIN/1.73M2 — SIGNIFICANT CHANGE UP
GLUCOSE SERPL-MCNC: 95 MG/DL — SIGNIFICANT CHANGE UP (ref 70–99)
GLUCOSE UR QL: NEGATIVE MG/DL — SIGNIFICANT CHANGE UP
HCT VFR BLD CALC: 37.9 % — SIGNIFICANT CHANGE UP (ref 34.5–45)
HGB BLD-MCNC: 11.7 G/DL — SIGNIFICANT CHANGE UP (ref 11.5–15.5)
KETONES UR-MCNC: NEGATIVE MG/DL — SIGNIFICANT CHANGE UP
LEUKOCYTE ESTERASE UR-ACNC: ABNORMAL
LIDOCAIN IGE QN: 35 U/L — SIGNIFICANT CHANGE UP (ref 7–60)
MCHC RBC-ENTMCNC: 23.4 PG — LOW (ref 27–34)
MCHC RBC-ENTMCNC: 30.9 GM/DL — LOW (ref 32–36)
MCV RBC AUTO: 75.8 FL — LOW (ref 80–100)
NITRITE UR-MCNC: NEGATIVE — SIGNIFICANT CHANGE UP
NRBC # BLD: 0 /100 WBCS — SIGNIFICANT CHANGE UP (ref 0–0)
PH UR: 5.5 — SIGNIFICANT CHANGE UP (ref 5–8)
PLATELET # BLD AUTO: 215 K/UL — SIGNIFICANT CHANGE UP (ref 150–400)
POTASSIUM SERPL-MCNC: 4.4 MMOL/L — SIGNIFICANT CHANGE UP (ref 3.5–5.3)
POTASSIUM SERPL-SCNC: 4.4 MMOL/L — SIGNIFICANT CHANGE UP (ref 3.5–5.3)
PROT UR-MCNC: NEGATIVE MG/DL — SIGNIFICANT CHANGE UP
RBC # BLD: 5 M/UL — SIGNIFICANT CHANGE UP (ref 3.8–5.2)
RBC # FLD: 13.2 % — SIGNIFICANT CHANGE UP (ref 10.3–14.5)
RBC CASTS # UR COMP ASSIST: 0 /HPF — SIGNIFICANT CHANGE UP (ref 0–4)
SODIUM SERPL-SCNC: 140 MMOL/L — SIGNIFICANT CHANGE UP (ref 135–145)
SP GR SPEC: 1.01 — SIGNIFICANT CHANGE UP (ref 1–1.03)
SQUAMOUS # UR AUTO: 1 /HPF — SIGNIFICANT CHANGE UP (ref 0–5)
UROBILINOGEN FLD QL: 1 MG/DL — SIGNIFICANT CHANGE UP (ref 0.2–1)
WBC # BLD: 4.32 K/UL — SIGNIFICANT CHANGE UP (ref 3.8–10.5)
WBC # FLD AUTO: 4.32 K/UL — SIGNIFICANT CHANGE UP (ref 3.8–10.5)
WBC UR QL: 2 /HPF — SIGNIFICANT CHANGE UP (ref 0–5)

## 2024-03-30 PROCEDURE — 83690 ASSAY OF LIPASE: CPT

## 2024-03-30 PROCEDURE — 81001 URINALYSIS AUTO W/SCOPE: CPT

## 2024-03-30 PROCEDURE — 74177 CT ABD & PELVIS W/CONTRAST: CPT | Mod: 26,MC

## 2024-03-30 PROCEDURE — 36415 COLL VENOUS BLD VENIPUNCTURE: CPT

## 2024-03-30 PROCEDURE — 80048 BASIC METABOLIC PNL TOTAL CA: CPT

## 2024-03-30 PROCEDURE — 85027 COMPLETE CBC AUTOMATED: CPT

## 2024-03-30 PROCEDURE — 99285 EMERGENCY DEPT VISIT HI MDM: CPT

## 2024-03-30 PROCEDURE — 99284 EMERGENCY DEPT VISIT MOD MDM: CPT | Mod: 25

## 2024-03-30 PROCEDURE — 74177 CT ABD & PELVIS W/CONTRAST: CPT | Mod: MC

## 2024-03-30 RX ORDER — SODIUM CHLORIDE 9 MG/ML
1000 INJECTION INTRAMUSCULAR; INTRAVENOUS; SUBCUTANEOUS ONCE
Refills: 0 | Status: COMPLETED | OUTPATIENT
Start: 2024-03-30 | End: 2024-03-30

## 2024-03-30 RX ORDER — IOHEXOL 300 MG/ML
30 INJECTION, SOLUTION INTRAVENOUS ONCE
Refills: 0 | Status: COMPLETED | OUTPATIENT
Start: 2024-03-30 | End: 2024-03-30

## 2024-03-30 RX ORDER — CELECOXIB 200 MG/1
1 CAPSULE ORAL
Qty: 0 | Refills: 0 | DISCHARGE

## 2024-03-30 RX ADMIN — SODIUM CHLORIDE 1000 MILLILITER(S): 9 INJECTION INTRAMUSCULAR; INTRAVENOUS; SUBCUTANEOUS at 16:47

## 2024-03-30 RX ADMIN — IOHEXOL 30 MILLILITER(S): 300 INJECTION, SOLUTION INTRAVENOUS at 16:47

## 2024-03-30 NOTE — ED PROVIDER NOTE - PROGRESS NOTE DETAILS
Pt w [facility], ct pending.  Pt signed out to Dr Montenegro. Leandro Montenegro MD: Patient signed out to me by Dr. Vincent pending CT result. CT without acute actionable findings--does note lumbar disc herniation/foraminal narrowing--discussed with patient at bedside--has known about this for years, has no acute neuro deficit/signs concerning for acute cord compression. Pain well controlled at this time. Recommended for continued supportive care/OTC meds and f/u with PMD. Possible ?mounjaro side effect.

## 2024-03-30 NOTE — ED PROVIDER NOTE - PHYSICAL EXAMINATION
VITAL SIGNS: I have reviewed nursing notes and confirm.  CONSTITUTIONAL:  in no acute distress.   SKIN:  warm and dry, no acute rash.   HEAD:  normocephalic, atraumatic.  EYES: PERRL, EOM intact; conjunctiva and sclera clear.  ENT: No nasal discharge; airway clear.   NECK: Supple; non tender.  CARD: S1, S2 normal; no murmurs, gallops, or rubs. Regular rate and rhythm.   RESP:  Clear to auscultation b/l, no wheezes, rales or rhonchi.  ABD: Normal bowel sounds; soft; non-distended; ttp llq; no guarding/ rebound. parris + soft stool high in vault, no fecal impaction  MSK: Normal ROM. No clubbing, cyanosis or edema. no ttp bilat le  NEURO: Alert, oriented, grossly unremarkable  PSYCH: Cooperative, mood and affect appropriate.

## 2024-03-30 NOTE — ED PROVIDER NOTE - NSICDXPASTMEDICALHX_GEN_ALL_CORE_FT
PAST MEDICAL HISTORY:  Asthma     Chronic back pain     Essential hypertension     Herniated lumbar intervertebral disc     HLD (hyperlipidemia)     Migraine with status migrainosus, not intractable, unspecified migraine type

## 2024-03-30 NOTE — ED PROVIDER NOTE - CLINICAL SUMMARY MEDICAL DECISION MAKING FREE TEXT BOX
Patient complaining of constipation, abdominal pain, with tenderness left lower quadrant on exam.?  Diverticulitis, constipation, doubt  UTI, stone.  Plan for labs, CT abdomen.  Reassess. See progress notes for further mdm related documentation.

## 2024-03-30 NOTE — ED PROVIDER NOTE - OBJECTIVE STATEMENT
54 yo F h/o htn, asthma, hld, disc herniation, s/p cholecystectomy complaining of constipation for approximately 1 week unrelieved with a Fleet enema.  Patient also notes lower abdominal pain for the past few days without associated fever, abdominal distention, nausea, vomiting.  No history of SBO.  Patient reports history of diverticulosis seen on an ultrasound.  Prior colonoscopy was normal except for polyps which were removed x 2.  No fever.  Patient reports history of constipation on occasion but  that current symptoms are of longer duration and she generally does not have lower abdominal pain.  No dysuria, frequency, urgency.  Pain worse with walking.  No meds at home for pain.  Pt on Mounjaro.

## 2024-03-30 NOTE — ED PROVIDER NOTE - PATIENT PORTAL LINK FT
You can access the FollowMyHealth Patient Portal offered by [facility] by registering at the following website: [email or web address]. By joining [employer]’s FollowMyHealth portal, you will also be able to view your health information using other applications (apps) compatible with our system.

## 2024-03-30 NOTE — ED PROVIDER NOTE - NSFOLLOWUPINSTRUCTIONS_ED_ALL_ED_FT
You were seen in the Emergency Department for: abdominal pain    For pain, you may take Tylenol (acetaminophen) 975 mg every 6 hours, AND/OR Advil (ibuprofen) 600 mg every 8 hours.    Please follow up with your primary physician. If you do not have a primary physician or specialist of your needs, please call [phone] to find one convenient for you. At this number you will be able to locate a provider who accepts your insurance, as well as locate the right specialist for your needs.    You should return to the Emergency Department if you feel any new/worsening/persistent symptoms including but not limited to: fever, chills, vomiting, chest pain, difficulty breathing, loss of consciousness, bleeding, uncontrolled pain, numbness/weakness of a body part

## 2024-03-30 NOTE — ED ADULT TRIAGE NOTE - CHIEF COMPLAINT QUOTE
Patient PMH DM to ED c/o constipation and abdominal pressure x 5 days, last BM x 1 week ago. On Mounjaro for weight loss, AAOx4, NAD.

## 2024-04-01 DIAGNOSIS — K59.00 CONSTIPATION, UNSPECIFIED: ICD-10-CM

## 2024-04-01 DIAGNOSIS — Z90.49 ACQUIRED ABSENCE OF OTHER SPECIFIED PARTS OF DIGESTIVE TRACT: ICD-10-CM

## 2024-04-01 DIAGNOSIS — E78.5 HYPERLIPIDEMIA, UNSPECIFIED: ICD-10-CM

## 2024-04-01 DIAGNOSIS — J45.909 UNSPECIFIED ASTHMA, UNCOMPLICATED: ICD-10-CM

## 2024-04-01 DIAGNOSIS — I10 ESSENTIAL (PRIMARY) HYPERTENSION: ICD-10-CM

## 2024-04-01 DIAGNOSIS — R10.30 LOWER ABDOMINAL PAIN, UNSPECIFIED: ICD-10-CM

## 2024-04-01 DIAGNOSIS — R10.32 LEFT LOWER QUADRANT PAIN: ICD-10-CM

## 2024-10-13 ENCOUNTER — EMERGENCY (EMERGENCY)
Facility: HOSPITAL | Age: 54
LOS: 1 days | Discharge: HOME | End: 2024-10-13
Attending: STUDENT IN AN ORGANIZED HEALTH CARE EDUCATION/TRAINING PROGRAM | Admitting: STUDENT IN AN ORGANIZED HEALTH CARE EDUCATION/TRAINING PROGRAM
Payer: MEDICARE

## 2024-10-13 VITALS
OXYGEN SATURATION: 100 % | HEART RATE: 104 BPM | WEIGHT: 167.99 LBS | DIASTOLIC BLOOD PRESSURE: 78 MMHG | TEMPERATURE: 98 F | RESPIRATION RATE: 24 BRPM | SYSTOLIC BLOOD PRESSURE: 122 MMHG

## 2024-10-13 VITALS
RESPIRATION RATE: 18 BRPM | HEART RATE: 101 BPM | SYSTOLIC BLOOD PRESSURE: 125 MMHG | TEMPERATURE: 98 F | DIASTOLIC BLOOD PRESSURE: 62 MMHG | OXYGEN SATURATION: 100 %

## 2024-10-13 DIAGNOSIS — Z98.890 OTHER SPECIFIED POSTPROCEDURAL STATES: Chronic | ICD-10-CM

## 2024-10-13 LAB
ANION GAP SERPL CALC-SCNC: 17 MMOL/L — SIGNIFICANT CHANGE UP (ref 5–17)
BUN SERPL-MCNC: 11 MG/DL — SIGNIFICANT CHANGE UP (ref 7–23)
CALCIUM SERPL-MCNC: 9.7 MG/DL — SIGNIFICANT CHANGE UP (ref 8.4–10.5)
CHLORIDE SERPL-SCNC: 102 MMOL/L — SIGNIFICANT CHANGE UP (ref 96–108)
CO2 SERPL-SCNC: 17 MMOL/L — LOW (ref 22–31)
CREAT SERPL-MCNC: 0.53 MG/DL — SIGNIFICANT CHANGE UP (ref 0.5–1.3)
EGFR: 109 ML/MIN/1.73M2 — SIGNIFICANT CHANGE UP
GLUCOSE SERPL-MCNC: 120 MG/DL — HIGH (ref 70–99)
HCT VFR BLD CALC: 35 % — SIGNIFICANT CHANGE UP (ref 34.5–45)
HGB BLD-MCNC: 11 G/DL — LOW (ref 11.5–15.5)
MCHC RBC-ENTMCNC: 22.9 PG — LOW (ref 27–34)
MCHC RBC-ENTMCNC: 31.4 GM/DL — LOW (ref 32–36)
MCV RBC AUTO: 72.8 FL — LOW (ref 80–100)
NRBC # BLD: 0 /100 WBCS — SIGNIFICANT CHANGE UP (ref 0–0)
PLATELET # BLD AUTO: 240 K/UL — SIGNIFICANT CHANGE UP (ref 150–400)
POTASSIUM SERPL-MCNC: SIGNIFICANT CHANGE UP (ref 3.5–5.3)
POTASSIUM SERPL-SCNC: SIGNIFICANT CHANGE UP (ref 3.5–5.3)
RBC # BLD: 4.81 M/UL — SIGNIFICANT CHANGE UP (ref 3.8–5.2)
RBC # FLD: 13.7 % — SIGNIFICANT CHANGE UP (ref 10.3–14.5)
SODIUM SERPL-SCNC: 136 MMOL/L — SIGNIFICANT CHANGE UP (ref 135–145)
WBC # BLD: 4.75 K/UL — SIGNIFICANT CHANGE UP (ref 3.8–10.5)
WBC # FLD AUTO: 4.75 K/UL — SIGNIFICANT CHANGE UP (ref 3.8–10.5)

## 2024-10-13 PROCEDURE — 94640 AIRWAY INHALATION TREATMENT: CPT

## 2024-10-13 PROCEDURE — 99285 EMERGENCY DEPT VISIT HI MDM: CPT

## 2024-10-13 PROCEDURE — 96374 THER/PROPH/DIAG INJ IV PUSH: CPT

## 2024-10-13 PROCEDURE — 96375 TX/PRO/DX INJ NEW DRUG ADDON: CPT

## 2024-10-13 PROCEDURE — 93005 ELECTROCARDIOGRAM TRACING: CPT

## 2024-10-13 PROCEDURE — 80048 BASIC METABOLIC PNL TOTAL CA: CPT

## 2024-10-13 PROCEDURE — 71045 X-RAY EXAM CHEST 1 VIEW: CPT | Mod: 26

## 2024-10-13 PROCEDURE — 85027 COMPLETE CBC AUTOMATED: CPT

## 2024-10-13 PROCEDURE — 71045 X-RAY EXAM CHEST 1 VIEW: CPT

## 2024-10-13 PROCEDURE — 99285 EMERGENCY DEPT VISIT HI MDM: CPT | Mod: 25

## 2024-10-13 PROCEDURE — 93010 ELECTROCARDIOGRAM REPORT: CPT

## 2024-10-13 PROCEDURE — 36415 COLL VENOUS BLD VENIPUNCTURE: CPT

## 2024-10-13 RX ORDER — SODIUM CHLORIDE 0.9 % (FLUSH) 0.9 %
1000 SYRINGE (ML) INJECTION ONCE
Refills: 0 | Status: COMPLETED | OUTPATIENT
Start: 2024-10-13 | End: 2024-10-13

## 2024-10-13 RX ORDER — MAGNESIUM SULFATE 500 MG/ML
2 VIAL (ML) INJECTION ONCE
Refills: 0 | Status: COMPLETED | OUTPATIENT
Start: 2024-10-13 | End: 2024-10-13

## 2024-10-13 RX ORDER — PREDNISONE 5 MG/1
1 TABLET ORAL
Qty: 4 | Refills: 0
Start: 2024-10-13 | End: 2024-10-16

## 2024-10-13 RX ORDER — ONDANSETRON HCL/PF 4 MG/2 ML
4 VIAL (ML) INJECTION ONCE
Refills: 0 | Status: COMPLETED | OUTPATIENT
Start: 2024-10-13 | End: 2024-10-13

## 2024-10-13 RX ORDER — METHYLPREDNISOLONE ACETATE 80 MG/ML
125 INJECTION, SUSPENSION INTRA-ARTICULAR; INTRALESIONAL; INTRAMUSCULAR; SOFT TISSUE ONCE
Refills: 0 | Status: COMPLETED | OUTPATIENT
Start: 2024-10-13 | End: 2024-10-13

## 2024-10-13 RX ORDER — IPRATROPIUM BROMIDE AND ALBUTEROL SULFATE .5; 3 MG/3ML; MG/3ML
3 SOLUTION RESPIRATORY (INHALATION)
Refills: 0 | Status: COMPLETED | OUTPATIENT
Start: 2024-10-13 | End: 2024-10-13

## 2024-10-13 RX ADMIN — METHYLPREDNISOLONE ACETATE 125 MILLIGRAM(S): 80 INJECTION, SUSPENSION INTRA-ARTICULAR; INTRALESIONAL; INTRAMUSCULAR; SOFT TISSUE at 16:03

## 2024-10-13 RX ADMIN — Medication 1000 MILLILITER(S): at 16:17

## 2024-10-13 RX ADMIN — IPRATROPIUM BROMIDE AND ALBUTEROL SULFATE 3 MILLILITER(S): .5; 3 SOLUTION RESPIRATORY (INHALATION) at 15:50

## 2024-10-13 RX ADMIN — Medication 4 MILLIGRAM(S): at 16:15

## 2024-10-13 RX ADMIN — Medication 150 GRAM(S): at 16:17

## 2024-10-13 RX ADMIN — IPRATROPIUM BROMIDE AND ALBUTEROL SULFATE 3 MILLILITER(S): .5; 3 SOLUTION RESPIRATORY (INHALATION) at 16:17

## 2024-10-13 RX ADMIN — IPRATROPIUM BROMIDE AND ALBUTEROL SULFATE 3 MILLILITER(S): .5; 3 SOLUTION RESPIRATORY (INHALATION) at 15:57

## 2024-10-13 NOTE — ED ADULT NURSE NOTE - NSFALLUNIVINTERV_ED_ALL_ED
Bed/Stretcher in lowest position, wheels locked, appropriate side rails in place/Call bell, personal items and telephone in reach/Instruct patient to call for assistance before getting out of bed/chair/stretcher/Non-slip footwear applied when patient is off stretcher/[place] to call system/Physically safe environment - no spills, clutter or unnecessary equipment/Purposeful proactive rounding/Room/bathroom lighting operational, light cord in reach

## 2024-10-13 NOTE — ED ADULT NURSE NOTE - OBJECTIVE STATEMENT
Pt arrives from [facility] for c/o asthma with sp02 wnl but with apparent resp distress- retractions, nasal flaring, unable to speak in complete sentences.     On assessment- AOx4, breathign labored, VSS in triage, steady gait unassisted, neuro intact with no apparent facial asymmetry, PERRLA. Placed on cm with spo2 monitoring, piv patent with labs sent.

## 2024-10-13 NOTE — ED PROVIDER NOTE - PHYSICAL EXAMINATION
Gen - Nontoxic, able to speak short sentences, A+Ox4   HEENT - NCAT, EOMI, moist mucous membranes, clear oropharynx  Neck - supple  Resp - faint expiratory wheeze b/l, somewhat poor air entry to bases, mildly tachypneic  CV -  tachycardic, regular, no m/r/g  Abd - soft, NT, ND; no guarding or rebound  MSK - FROM of b/l UE and LE, no gross deformities  Extrem - no LE edema/erythema/tenderness  Neuro - no focal motor or sensation deficits  Skin - warm, well perfused

## 2024-10-13 NOTE — ED PROVIDER NOTE - NSFOLLOWUPINSTRUCTIONS_ED_ALL_ED_FT
You were seen in the Emergency Department for: asthma flare    You were prescribed to the pharmacy: prednisone  Please follow the instructions on the container/label and ask your pharmacist for any questions/concerns.    Please follow up with your primary physician and pulmonologist. If you do not have a primary physician or specialist of your needs, please call [phone] to find one convenient for you. At this number you will be able to locate a provider who accepts your insurance, as well as locate the right specialist for your needs.    You should return to the Emergency Department if you feel any new/worsening/persistent symptoms including but not limited to: fever, chills, vomiting, chest pain, difficulty breathing, loss of consciousness, bleeding, uncontrolled pain, numbness/weakness of a body part

## 2024-10-13 NOTE — ED ADULT TRIAGE NOTE - CHIEF COMPLAINT QUOTE
Patient to ED c/o worsening asthma exacerbation x 24 hours. 100% O2 RA, unable to speak in complete sentences, AAOX4, NAD.

## 2024-10-13 NOTE — ED PROVIDER NOTE - PROGRESS NOTE DETAILS
Leandro Montenegro MD: Patient reassessed--breathing much improved, no longer tachypneic, subjectively feels better, able to speak full sentences normally. On repeat ascultation, she has significant improvement in aeration of her lungs b/l, no wheeze appreciated. Deemed good candidate for continued steroid via prednisone and outpatient f/u. Return precautions given.

## 2024-10-13 NOTE — ED PROVIDER NOTE - PATIENT PORTAL LINK FT
You can access the FollowMyHealth Patient Portal offered by [facility] by registering at the following website: [email or web address]. By joining [employer]’s FollowMyHealth portal, you will also be able to view your health information using other applications (apps) compatible with our system.

## 2024-10-13 NOTE — ED PROVIDER NOTE - CLINICAL SUMMARY MEDICAL DECISION MAKING FREE TEXT BOX
54 year old female with history of HTN, HLD, asthma, lumbar disc herniation, migraines, s/p cholecystectomy, presenting with SOB x 2d. 54 year old female with history of HTN, HLD, asthma, lumbar disc herniation, migraines, s/p cholecystectomy, presenting with SOB x 2d. Overall nontoxic appearing, protecting her airway, O2 sat 100% on RA on arrival. Does appear tachypneic with increased WOB at time of my eval--receiving duonebs, will also give IV steroid and magnesium doses, closely monitor--no indication overall at this moment for epi or BIPAP. EKG sinus tach, nonischemic. No risk factors for PE, no evidence of DVT, and wheeze auscultated on exam. If symptomatically improved with nonactionable labwork/CXR, anticipate DC with PO prednisone course and continued nebs/MDI use at home with pulm f/u outpatient.

## 2024-10-13 NOTE — ED PROVIDER NOTE - IV ALTEPLASE EXCL ABS HIDDEN
Make an appointment with Rayshawn Wesley MD in one week for suture removal.    WOUND CARE INSTRUCTIONS  ENT DEPARTMENT        After surgery, you will need to take care of your incision as it heals. During healing, you may notice some soreness, tenderness, tingling, numbness, and itching around your incision.  There may also be “mild” oozing and bruising and a small lump may form.  This is normal and no cause for concern.    The local anesthetic will last one to several hours after your excision.  After the anesthetic wears off, you may want to take Acetaminophen for discomfort as long as this medication is approved for you. You can apply ice if there is swelling or bruising.   The dressing applied in the office should be removed after 24 hours. Try to keep uncovered after the 1st day.  Check your incision daily, clean gently with soap and water twice daily and apply antibiotic ointment sparingly 3-4 times daily after cleaning. Apply ointment before and after showering, while avoiding direct water pressure on the incision.  Report any of the following:  Increased redness around the postoperative site  Increased swelling around the postoperative site  Increased pain not controlled by Acetaminophen  Separation of the wound edges  Purulent drainage from the site  Bleeding described as more than serous drainage  Do not use lotion, creams, vitamin oils, or powder on your incision.  Do not expose your incision to sunlight or tanning booths.   Stitches are usually removed 7 to 14 days after the excision.  An appointment will be provided.  Please call [phone] for any questions or concerns.     
show

## 2024-10-14 PROBLEM — J45.909 UNSPECIFIED ASTHMA, UNCOMPLICATED: Chronic | Status: ACTIVE | Noted: 2024-03-30

## 2024-10-14 PROBLEM — E78.5 HYPERLIPIDEMIA, UNSPECIFIED: Chronic | Status: ACTIVE | Noted: 2024-03-30

## 2024-10-17 DIAGNOSIS — E78.5 HYPERLIPIDEMIA, UNSPECIFIED: ICD-10-CM

## 2024-10-17 DIAGNOSIS — I10 ESSENTIAL (PRIMARY) HYPERTENSION: ICD-10-CM

## 2024-10-17 DIAGNOSIS — J45.901 UNSPECIFIED ASTHMA WITH (ACUTE) EXACERBATION: ICD-10-CM

## 2024-10-17 DIAGNOSIS — R06.02 SHORTNESS OF BREATH: ICD-10-CM

## 2024-10-17 DIAGNOSIS — R00.0 TACHYCARDIA, UNSPECIFIED: ICD-10-CM

## 2024-11-20 NOTE — ED ADULT NURSE NOTE - GASTROINTESTINAL ASSESSMENT
Refill request    Medication: cyclobenzaprine (FLEXERIL) 10 MG tablet     Sig: TAKE ONE TABLET BY MOUTH THREE TIMES A DAY AS NEEDED FOR MUSCLE SPASMS     Dispensed: 60  Refills: 0    Last prescribed to patient: 10/23/24     Last clinic appointment: 8/19/24  Next clinic appointment: none listed      Preferred pharmacy:    [facility] [address] [place] [address]        Refill request routed to the provider to review.        - - -

## 2025-04-01 ENCOUNTER — EMERGENCY (EMERGENCY)
Facility: HOSPITAL | Age: 55
LOS: 1 days | Discharge: HOME | End: 2025-04-01
Admitting: EMERGENCY MEDICINE
Payer: MEDICARE

## 2025-04-01 VITALS
HEART RATE: 94 BPM | OXYGEN SATURATION: 98 % | HEIGHT: 64 IN | DIASTOLIC BLOOD PRESSURE: 78 MMHG | SYSTOLIC BLOOD PRESSURE: 119 MMHG | WEIGHT: 156.97 LBS | RESPIRATION RATE: 18 BRPM | TEMPERATURE: 98 F

## 2025-04-01 DIAGNOSIS — Z98.890 OTHER SPECIFIED POSTPROCEDURAL STATES: Chronic | ICD-10-CM

## 2025-04-01 PROCEDURE — 73630 X-RAY EXAM OF FOOT: CPT

## 2025-04-01 PROCEDURE — 99283 EMERGENCY DEPT VISIT LOW MDM: CPT | Mod: 25

## 2025-04-01 PROCEDURE — 73630 X-RAY EXAM OF FOOT: CPT | Mod: 26,LT

## 2025-04-01 PROCEDURE — 99284 EMERGENCY DEPT VISIT MOD MDM: CPT

## 2025-04-01 PROCEDURE — 96372 THER/PROPH/DIAG INJ SC/IM: CPT

## 2025-04-01 RX ORDER — KETOROLAC TROMETHAMINE 30 MG/ML
15 INJECTION, SOLUTION INTRAMUSCULAR; INTRAVENOUS ONCE
Refills: 0 | Status: DISCONTINUED | OUTPATIENT
Start: 2025-04-01 | End: 2025-04-01

## 2025-04-01 RX ADMIN — KETOROLAC TROMETHAMINE 15 MILLIGRAM(S): 30 INJECTION, SOLUTION INTRAMUSCULAR; INTRAVENOUS at 19:36

## 2025-04-01 NOTE — ED PROVIDER NOTE - PATIENT PORTAL LINK FT
You can access the FollowMyHealth Patient Portal offered by [facility] by registering at the following website: [email or web address]. By joining [employer]s FollowMyHealth portal, you will also be able to view your health information using other applications (apps) compatible with our system.

## 2025-04-01 NOTE — ED PROVIDER NOTE - OBJECTIVE STATEMENT
54 F pmh htn, hld, asthma, lumbar disc disease p/w L great toe pain x 2 days.  pt reports pain over L 1st MTP worse w/ walking/touching and flexing the toe.  denies any trauma or skin changes.  took naproxen yesterday for pain.  denies f/c, skin changes, numbness/weakness, paresthesias, h/o gout or other concerns

## 2025-04-01 NOTE — ED PROVIDER NOTE - PHYSICAL EXAMINATION
Gen: well appearing, no acute distress  Skin: warm/dry, no rash noted  Resp: breathing comfortably, speaking in full sentences, no dyspnea  L foot: + ttp over 1st MTP, no skin changes or warmth, pain w/ flexion 1st MTP but FROM all joints, SILT, brisk cap refill, DP/PT pulse 2+, no calf ttp/edema   Neuro: alert/oriented, ambulatory

## 2025-04-01 NOTE — ED ADULT NURSE NOTE - OBJECTIVE STATEMENT
Alert and orientedx4, ambulatory with steady gait, c/o left great toe pain occurred spontaneously without injury yesterday. Mild swelling noted to toe. No open wounds or discharge.

## 2025-04-01 NOTE — ED ADULT TRIAGE NOTE - CHIEF COMPLAINT QUOTE
Pt presents to ED here for L toe pain started yesterday, denies fall or injury. Pt A&Ox4, NAD and ambulatory.

## 2025-04-01 NOTE — ED PROVIDER NOTE - CLINICAL SUMMARY MEDICAL DECISION MAKING FREE TEXT BOX
54 F pmh htn, hld, asthma, lumbar disc disease p/w atraumatic L great toe pain x 2 days.  on exam vss, afebrile, L foot: + ttp over 1st MTP, no skin changes or warmth, pain w/ flexion 1st MTP but FROM all joints, SILT, brisk cap refill, DP/PT pulse 2+, no calf ttp/edema.  suspect msk pain vs tendonitis vs gout, no c/f septic joint or infectious etiology.  no trauma so do not think fx but will obtain xray and give toradol for pain    xray shows no fx or dislocation, ossicles noted over 1st MTP joint.  educated on RICE and f/u with pmd.  discussed strict return parameters

## 2025-04-01 NOTE — ED PROVIDER NOTE - NSFOLLOWUPINSTRUCTIONS_ED_ALL_ED_FT
Please rest and remain well hydrated with plenty of fluids.  You can take motrin 600-800mg and tylenol 650mg every 3 hours, switching between the two for pain    Please call to arrange follow up with primary care doctor within one week    Gout  A foot with gout, showing uric acid crystals in a joint, along with an image that shows swelling on the outside of the foot.  Gout is a condition that causes painful swelling of the joints. Gout is a type of inflammation of the joints (arthritis). This condition is caused by having too much uric acid in the body. Uric acid is a chemical that forms when the body breaks down substances called purines. Purines are important for building body proteins.    When the body has too much uric acid, sharp crystals can form and build up inside the joints. This causes pain and swelling. Gout attacks can happen quickly and may be very painful (acute gout). Over time, the attacks can affect more joints and become more frequent (chronic gout). Gout can also cause uric acid to build up under the skin and inside the kidneys.    What are the causes?  This condition is caused by too much uric acid in your blood. This can happen because:  Your kidneys do not remove enough uric acid from your blood. This is the most common cause.  Your body makes too much uric acid. This can happen with some cancers and cancer treatments. It can also occur if your body is breaking down too many red blood cells (hemolytic anemia).  You eat too many foods that are high in purines. These foods include organ meats and some seafood. Alcohol, especially beer, is also high in purines.  A gout attack may be triggered by trauma or stress.    What increases the risk?  The following factors may make you more likely to develop this condition:  Having a family history of gout.  Being male and middle-aged.  Being female and having gone through menopause.  Taking certain medicines, including aspirin, cyclosporine, diuretics, levodopa, and niacin.  Having an organ transplant.  Having certain conditions, such as:  Being obese.  Lead poisoning.  Kidney disease.  A skin condition called psoriasis.  Other factors include:  Losing weight too quickly.  Being dehydrated.  Frequently drinking alcohol, especially beer.  Frequently drinking beverages that are sweetened with a type of sugar called fructose.  What are the signs or symptoms?  A foot and ankle with swelling and shiny, purple skin caused by gout.   An attack of acute gout happens quickly. It usually occurs in just one joint. The most common place is the big toe. Attacks often start at night. Other joints that may be affected include joints of the feet, ankle, knee, fingers, wrist, or elbow. Symptoms of this condition may include:  Severe pain.  Warmth.  Swelling.  Stiffness.  Tenderness. The affected joint may be very painful to touch.  Shiny, red, or purple skin.  Chills and fever.  Chronic gout may cause symptoms more frequently. More joints may be involved. You may also have white or yellow lumps (tophi) on your hands or feet or in other areas near your joints.    How is this diagnosed?  This condition is diagnosed based on your symptoms, your medical history, and a physical exam. You may have tests, such as:  Blood tests to measure uric acid levels.  Removal of joint fluid with a thin needle (aspiration) to look for uric acid crystals.  X-rays to look for joint damage.  How is this treated?  Treatment for this condition has two phases: treating an acute attack and preventing future attacks. Acute gout treatment may include medicines to reduce pain and swelling, including:  NSAIDs, such as ibuprofen.  Steroids. These are strong anti-inflammatory medicines that can be taken by mouth (orally) or injected into a joint.  Colchicine. This medicine relieves pain and swelling when it is taken soon after an attack. It can be given by mouth or through an IV.  Preventive treatment may include:  Daily use of smaller doses of NSAIDs or colchicine.  Use of a medicine that reduces uric acid levels in your blood, such as allopurinol.  Changes to your diet. You may need to see a dietitian about what to eat and drink to prevent gout.  Follow these instructions at home:  During a gout attack    Bag of ice on a towel on the skin.   If directed, put ice on the affected area. To do this:  Put ice in a plastic bag.  Place a towel between your skin and the bag.  Leave the ice on for 20 minutes, 2–3 times a day.  Remove the ice if your skin turns bright red. This is very important. If you cannot feel pain, heat, or cold, you have a greater risk of damage to the area.  Raise (elevate) the affected joint above the level of your heart as often as possible.  Rest the joint as much as possible. If the affected joint is in your leg, you may be given crutches to use.  Follow instructions from your health care provider about eating or drinking restrictions.  Avoiding future gout attacks    Follow a low-purine diet as told by your dietitian or health care provider. Avoid foods and drinks that are high in purines, including liver, kidney, anchovies, asparagus, herring, mushrooms, mussels, and beer.  Maintain a healthy weight or lose weight if you are overweight. If you want to lose weight, talk with your health care provider. Do not lose weight too quickly.  Start or maintain an exercise program as told by your health care provider.  Eating and drinking    Avoid drinking beverages that contain fructose.  Drink enough fluids to keep your urine pale yellow.  If you drink alcohol:  Limit how much you have to:  0–1 drink a day for women who are not pregnant.  0–2 drinks a day for men.  Know how much alcohol is in a drink. In the [place] one drink equals one 12 oz bottle of beer (355 mL), one 5 oz glass of wine (148 mL), or one 1½ oz glass of hard liquor (44 mL).  General instructions    Take over-the-counter and prescription medicines only as told by your health care provider.  Ask your health care provider if the medicine prescribed to you requires you to avoid driving or using machinery.  Return to your normal activities as told by your health care provider. Ask your health care provider what activities are safe for you.  Keep all follow-up visits. This is important.  Where to find more information  National Institutes of Health: www.niams.nih.gov  Contact a health care provider if you have:  Another gout attack.  Continuing symptoms of a gout attack after 10 days of treatment.  Side effects from your medicines.  Chills or a fever.  Burning pain when you urinate.  Pain in your lower back or abdomen.  Get help right away if you:  Have severe or uncontrolled pain.  Cannot urinate.  Summary  Gout is painful swelling of the joints caused by having too much uric acid in the body.  The most common site for gout to occur is in the big toe, but it can affect other joints in the body.  Medicines and dietary changes can help to prevent and treat gout attacks.  This information is not intended to replace advice given to you by your health care provider. Make sure you discuss any questions you have with your health care provider.

## 2025-04-03 DIAGNOSIS — M25.572 PAIN IN LEFT ANKLE AND JOINTS OF LEFT FOOT: ICD-10-CM

## 2025-04-03 DIAGNOSIS — J45.909 UNSPECIFIED ASTHMA, UNCOMPLICATED: ICD-10-CM

## 2025-04-03 DIAGNOSIS — E78.5 HYPERLIPIDEMIA, UNSPECIFIED: ICD-10-CM

## 2025-04-03 DIAGNOSIS — I10 ESSENTIAL (PRIMARY) HYPERTENSION: ICD-10-CM
